# Patient Record
Sex: MALE | Race: WHITE | Employment: FULL TIME | URBAN - METROPOLITAN AREA
[De-identification: names, ages, dates, MRNs, and addresses within clinical notes are randomized per-mention and may not be internally consistent; named-entity substitution may affect disease eponyms.]

---

## 2017-02-16 DIAGNOSIS — I25.10 CAD (CORONARY ARTERY DISEASE): ICD-10-CM

## 2017-02-16 DIAGNOSIS — E78.00 HYPERCHOLESTEROLEMIA: ICD-10-CM

## 2017-02-16 RX ORDER — ATORVASTATIN CALCIUM 40 MG/1
40 TABLET, FILM COATED ORAL DAILY
Qty: 90 TABLET | Refills: 3 | Status: SHIPPED | OUTPATIENT
Start: 2017-02-16 | End: 2017-02-21

## 2017-02-21 DIAGNOSIS — I25.10 CAD (CORONARY ARTERY DISEASE): ICD-10-CM

## 2017-02-21 DIAGNOSIS — E78.00 HYPERCHOLESTEROLEMIA: ICD-10-CM

## 2017-02-21 RX ORDER — ATORVASTATIN CALCIUM 40 MG/1
40 TABLET, FILM COATED ORAL DAILY
Qty: 90 TABLET | Refills: 2 | Status: SHIPPED | OUTPATIENT
Start: 2017-02-21 | End: 2018-03-30

## 2017-03-15 ENCOUNTER — OFFICE VISIT (OUTPATIENT)
Dept: FAMILY MEDICINE | Facility: CLINIC | Age: 57
End: 2017-03-15
Payer: COMMERCIAL

## 2017-03-15 VITALS
HEART RATE: 81 BPM | HEIGHT: 73 IN | BODY MASS INDEX: 28.34 KG/M2 | SYSTOLIC BLOOD PRESSURE: 128 MMHG | DIASTOLIC BLOOD PRESSURE: 78 MMHG | WEIGHT: 213.8 LBS | OXYGEN SATURATION: 98 % | TEMPERATURE: 98.1 F

## 2017-03-15 DIAGNOSIS — Z11.59 NEED FOR HEPATITIS C SCREENING TEST: ICD-10-CM

## 2017-03-15 DIAGNOSIS — Z00.00 ENCOUNTER FOR ROUTINE ADULT HEALTH EXAMINATION WITHOUT ABNORMAL FINDINGS: Primary | ICD-10-CM

## 2017-03-15 DIAGNOSIS — R06.83 SNORING: ICD-10-CM

## 2017-03-15 DIAGNOSIS — H61.23 BILATERAL IMPACTED CERUMEN: ICD-10-CM

## 2017-03-15 LAB
ALBUMIN SERPL-MCNC: 4 G/DL (ref 3.4–5)
ALP SERPL-CCNC: 78 U/L (ref 40–150)
ALT SERPL W P-5'-P-CCNC: 38 U/L (ref 0–70)
ANION GAP SERPL CALCULATED.3IONS-SCNC: 5 MMOL/L (ref 3–14)
AST SERPL W P-5'-P-CCNC: 19 U/L (ref 0–45)
BASOPHILS # BLD AUTO: 0 10E9/L (ref 0–0.2)
BASOPHILS NFR BLD AUTO: 0.5 %
BILIRUB SERPL-MCNC: 1.4 MG/DL (ref 0.2–1.3)
BUN SERPL-MCNC: 15 MG/DL (ref 7–30)
CALCIUM SERPL-MCNC: 9.2 MG/DL (ref 8.5–10.1)
CHLORIDE SERPL-SCNC: 105 MMOL/L (ref 94–109)
CHOLEST SERPL-MCNC: 163 MG/DL
CO2 SERPL-SCNC: 32 MMOL/L (ref 20–32)
CREAT SERPL-MCNC: 0.97 MG/DL (ref 0.66–1.25)
DIFFERENTIAL METHOD BLD: NORMAL
EOSINOPHIL # BLD AUTO: 0.4 10E9/L (ref 0–0.7)
EOSINOPHIL NFR BLD AUTO: 5.1 %
ERYTHROCYTE [DISTWIDTH] IN BLOOD BY AUTOMATED COUNT: 12.7 % (ref 10–15)
GFR SERPL CREATININE-BSD FRML MDRD: 80 ML/MIN/1.7M2
GLUCOSE SERPL-MCNC: 104 MG/DL (ref 70–99)
HCT VFR BLD AUTO: 46.3 % (ref 40–53)
HCV AB SERPL QL IA: NORMAL
HDLC SERPL-MCNC: 50 MG/DL
HGB BLD-MCNC: 16.1 G/DL (ref 13.3–17.7)
LDLC SERPL CALC-MCNC: 98 MG/DL
LYMPHOCYTES # BLD AUTO: 2.5 10E9/L (ref 0.8–5.3)
LYMPHOCYTES NFR BLD AUTO: 32.1 %
MCH RBC QN AUTO: 31.5 PG (ref 26.5–33)
MCHC RBC AUTO-ENTMCNC: 34.8 G/DL (ref 31.5–36.5)
MCV RBC AUTO: 91 FL (ref 78–100)
MONOCYTES # BLD AUTO: 0.6 10E9/L (ref 0–1.3)
MONOCYTES NFR BLD AUTO: 8.2 %
NEUTROPHILS # BLD AUTO: 4.1 10E9/L (ref 1.6–8.3)
NEUTROPHILS NFR BLD AUTO: 54.1 %
NONHDLC SERPL-MCNC: 113 MG/DL
PLATELET # BLD AUTO: 245 10E9/L (ref 150–450)
POTASSIUM SERPL-SCNC: 4.7 MMOL/L (ref 3.4–5.3)
PROT SERPL-MCNC: 7.8 G/DL (ref 6.8–8.8)
PSA SERPL-ACNC: 0.82 UG/L (ref 0–4)
RBC # BLD AUTO: 5.11 10E12/L (ref 4.4–5.9)
SODIUM SERPL-SCNC: 142 MMOL/L (ref 133–144)
TRIGL SERPL-MCNC: 74 MG/DL
TSH SERPL DL<=0.005 MIU/L-ACNC: 1.99 MU/L (ref 0.4–4)
WBC # BLD AUTO: 7.6 10E9/L (ref 4–11)

## 2017-03-15 PROCEDURE — 36415 COLL VENOUS BLD VENIPUNCTURE: CPT | Performed by: PHYSICIAN ASSISTANT

## 2017-03-15 PROCEDURE — G0103 PSA SCREENING: HCPCS | Performed by: PHYSICIAN ASSISTANT

## 2017-03-15 PROCEDURE — 86803 HEPATITIS C AB TEST: CPT | Performed by: PHYSICIAN ASSISTANT

## 2017-03-15 PROCEDURE — 80061 LIPID PANEL: CPT | Performed by: PHYSICIAN ASSISTANT

## 2017-03-15 PROCEDURE — 80050 GENERAL HEALTH PANEL: CPT | Performed by: PHYSICIAN ASSISTANT

## 2017-03-15 PROCEDURE — 99396 PREV VISIT EST AGE 40-64: CPT | Performed by: PHYSICIAN ASSISTANT

## 2017-03-15 NOTE — PROGRESS NOTES
SUBJECTIVE:     CC: Beny Jerome is an 57 year old male who presents for preventative health visit.     Healthy Habits:    Do you get at least three servings of calcium containing foods daily (dairy, green leafy vegetables, etc.)? yes    Amount of exercise or daily activities, outside of work: 7 day(s) per week, the 2 weeks on and off, usually does 10,000 steps a day    Problems taking medications regularly No    Medication side effects: No    Have you had an eye exam in the past two years? no    Do you see a dentist twice per year? yes    Do you have sleep apnea, excessive snoring or daytime drowsiness? Wife states that she notice he stops breathing in the night and will wake him up if she notices        Patient reports that his wife is concerned because she hears him stop breathing at night.  He reports that he is tired during the day and has been told that he snores at night.  He has a history of heart disease and has a strong family history of heart disease as well.        Today's PHQ-2 Score:   PHQ-2 ( 1999 [employer]) 3/15/2017 2/24/2012   Q1: Little interest or pleasure in doing things 0 0   Q2: Feeling down, depressed or hopeless 0 0   PHQ-2 Score 0 0       Abuse: Current or Past(Physical, Sexual or Emotional)- No  Do you feel safe in your environment - Yes    Social History   Substance Use Topics     Smoking status: Never Smoker     Smokeless tobacco: Never Used     Alcohol use No      Comment: quit 1990     The patient does not drink >3 drinks per day nor >7 drinks per week.    Last PSA:   PSA   Date Value Ref Range Status   01/28/2015 1.00 0 - 4 ug/L Final       Recent Labs   Lab Test  06/23/15   0812  03/16/12   0851   CHOL  143  256*   HDL  47  49   LDL  74  182*   TRIG  108  126   CHOLHDLRATIO  3.0  5.2*       Reviewed orders with patient. Reviewed health maintenance and updated orders accordingly - Yes    Reviewed and updated as needed this visit by clinical staff  Tobacco  Allergies  Meds  Med Hx   "Surg Hx  Fam Hx  Soc Hx        Reviewed and updated as needed this visit by Provider            ROS:  C: NEGATIVE for fever, chills, change in weight  I: NEGATIVE for worrisome rashes, moles or lesions  E: NEGATIVE for vision changes or irritation  ENT: NEGATIVE for ear, mouth and throat problems  R: NEGATIVE for significant cough or SOB  CV: NEGATIVE for chest pain, palpitations or peripheral edema  GI: NEGATIVE for nausea, abdominal pain, heartburn, or change in bowel habits   male: negative for dysuria, hematuria, decreased urinary stream, erectile dysfunction, urethral discharge  M: NEGATIVE for significant arthralgias or myalgia  N: NEGATIVE for weakness, dizziness or paresthesias  P: NEGATIVE for changes in mood or affect    Problem list, Medication list, Allergies, and Medical/Social/Surgical histories reviewed in EPIC and updated as appropriate.  OBJECTIVE:     /78 (BP Location: Left arm, Patient Position: Chair, Cuff Size: Adult Large)  Pulse 81  Temp 98.1  F (36.7  C) (Oral)  Ht 6' 1\" (1.854 m)  Wt 213 lb 12.8 oz (97 kg)  SpO2 98%  BMI 28.21 kg/m2  EXAM:  GENERAL: healthy, alert and no distress  EYES: Eyes grossly normal to inspection, PERRL and conjunctivae and sclerae normal  HENT: ear canals and TM's normal, nose and mouth without ulcers or lesions  NECK: no adenopathy, no asymmetry, masses, or scars and thyroid normal to palpation  RESP: lungs clear to auscultation - no rales, rhonchi or wheezes  CV: regular rate and rhythm, normal S1 S2, no S3 or S4, no murmur, click or rub, no peripheral edema and peripheral pulses strong  ABDOMEN: soft, nontender, no hepatosplenomegaly, no masses and bowel sounds normal   (male): normal male genitalia without lesions or urethral discharge, no hernia  RECTAL: normal sphincter tone, no rectal masses, prostate normal size, smooth, nontender without nodules or masses  MS: no gross musculoskeletal defects noted, no edema  SKIN: no suspicious lesions or " "rashes  NEURO: Normal strength and tone, mentation intact and speech normal  PSYCH: mentation appears normal, affect normal/bright    ASSESSMENT/PLAN:     1. Encounter for routine adult health examination without abnormal findings    - PROSTATE SPEC ANTIGEN SCREEN  - Lipid Profile with reflex to direct LDL  - CBC with platelets and differential  - Comprehensive metabolic panel (BMP + Alb, Alk Phos, ALT, AST, Total. Bili, TP)  - TSH with free T4 reflex    2. Need for hepatitis C screening test    - Hepatitis C Screen Reflex to HCV RNA Quant and Genotype    3. Snoring    - SLEEP EVALUATION & MANAGEMENT REFERRAL - ADULT; Future    4. Bilateral impacted cerumen    - REMOVE IMPACTED CERUMEN    COUNSELING:  Reviewed preventive health counseling, as reflected in patient instructions         reports that he has never smoked. He has never used smokeless tobacco.    Estimated body mass index is 28.21 kg/(m^2) as calculated from the following:    Height as of this encounter: 6' 1\" (1.854 m).    Weight as of this encounter: 213 lb 12.8 oz (97 kg).   Weight management plan: Discussed healthy diet and exercise guidelines and patient will follow up in 12 months in clinic to re-evaluate.    Counseling Resources:  ATP IV Guidelines  Pooled Cohorts Equation Calculator  FRAX Risk Assessment  ICSI Preventive Guidelines  Dietary Guidelines for Americans, 2010  USDA's MyPlate  ASA Prophylaxis  Lung CA Screening    Mavis Arriaga PA-C  [facility]  "

## 2017-03-15 NOTE — MR AVS SNAPSHOT
After Visit Summary   3/15/2017    Beny Jerome    MRN: [id number]           Patient Information     Date Of Birth          1960        Visit Information        Provider Department      3/15/2017 7:40 AM Mavis Arriaga PA-C [facility] [place] [facility]        Today's Diagnoses     Encounter for routine adult health examination without abnormal findings    -  1    Need for hepatitis C screening test        Snoring          Care Instructions    Please make a sleep appointment.       Preventive Health Recommendations  Male Ages 50 - 64    Yearly exam:             See your health care provider every year in order to  o   Review health changes.   o   Discuss preventive care.    o   Review your medicines if your doctor has prescribed any.     Have a cholesterol test every 5 years, or more frequently if you are at risk for high cholesterol/heart disease.     Have a diabetes test (fasting glucose) every three years. If you are at risk for diabetes, you should have this test more often.     Have a colonoscopy at age 50, or have a yearly FIT test (stool test). These exams will check for colon cancer.      Talk with your health care provider about whether or not a prostate cancer screening test (PSA) is right for you.    You should be tested each year for STDs (sexually transmitted diseases), if you re at risk.     Shots: Get a flu shot each year. Get a tetanus shot every 10 years.     Nutrition:    Eat at least 5 servings of fruits and vegetables daily.     Eat whole-grain bread, whole-wheat pasta and brown rice instead of white grains and rice.     Talk to your provider about Calcium and Vitamin D.     Lifestyle    Exercise for at least 150 minutes a week (30 minutes a day, 5 days a week). This will help you control your weight and prevent disease.     Limit alcohol to one drink per day.     No smoking.     Wear sunscreen to prevent skin cancer.     See your dentist every six months for an exam and  cleaning.     See your eye doctor every 1 to 2 years.          Follow-ups after your visit        Additional Services     SLEEP EVALUATION & MANAGEMENT REFERRAL - ADULT       Please be aware that coverage of these services is subject to the terms and limitations of your health insurance plan.  Call member services at your health plan with any benefit or coverage questions.      Please bring the following to your appointment:    >>   List of current medications   >>   This referral request   >>   Any documents/labs given to you for this referral    [facility] [phone] (Age 18 and up)                  Future tests that were ordered for you today     Open Future Orders        Priority Expected Expires Ordered    SLEEP EVALUATION & MANAGEMENT REFERRAL - ADULT Routine  3/15/2018 3/15/2017            Who to contact     If you have questions or need follow up information about today's clinic visit or your schedule please contact [facility] directly at [phone].  Normal or non-critical lab and imaging results will be communicated to you by MyChart, letter or phone within 4 business days after the clinic has received the results. If you do not hear from us within 7 days, please contact the clinic through [employer] or phone. If you have a critical or abnormal lab result, we will notify you by phone as soon as possible.  Submit refill requests through [employer] or call your pharmacy and they will forward the refill request to us. Please allow 3 business days for your refill to be completed.          Additional Information About Your Visit        [employer] Information     [employer] gives you secure access to your electronic health record. If you see a primary care provider, you can also send messages to your care team and make appointments. If you have questions, please call your primary care clinic.  If you do not have a primary care provider, please call [phone] and they will  "assist you.        Care EveryWhere ID     This is your Care EveryWhere ID. This could be used by other organizations to access your [place] medical records  [id number]        Your Vitals Were     Pulse Temperature Height Pulse Oximetry BMI (Body Mass Index)       81 98.1  F (36.7  C) (Oral) 6' 1\" (1.854 m) 98% 28.21 kg/m2        Blood Pressure from Last 3 Encounters:   03/15/17 128/78   09/10/15 136/72   06/23/15 122/68    Weight from Last 3 Encounters:   03/15/17 213 lb 12.8 oz (97 kg)   09/10/15 208 lb (94.3 kg)   06/23/15 201 lb (91.2 kg)              We Performed the Following     CBC with platelets and differential     Comprehensive metabolic panel (BMP + Alb, Alk Phos, ALT, AST, Total. Bili, TP)     Hepatitis C Screen Reflex to HCV RNA Quant and Genotype     Lipid Profile with reflex to direct LDL     PROSTATE SPEC ANTIGEN SCREEN     TSH with free T4 reflex          Today's Medication Changes          These changes are accurate as of: 3/15/17  8:51 AM.  If you have any questions, ask your nurse or doctor.               Stop taking these medicines if you haven't already. Please contact your care team if you have questions.     melatonin 5 MG Caps   Stopped by:  Mavis Arriaga PA-C                    Primary Care Provider Office Phone # Fax #    EN Rubio [phone]       [address] [address]        Thank you!     Thank you for choosing [facility]  for your care. Our goal is always to provide you with excellent care. Hearing back from our patients is one way we can continue to improve our services. Please take a few minutes to complete the written survey that you may receive in the mail after your visit with us. Thank you!             Your Updated Medication List - Protect others around you: Learn how to safely use, store and throw away your medicines at www.disposemymeds.org.          This list is accurate as " of: 3/15/17  8:51 AM.  Always use your most recent med list.                   Brand Name Dispense Instructions for use    ASPIRIN ADULT LOW STRENGTH PO      Take 81 mg by mouth daily       atorvastatin 40 MG tablet    LIPITOR    90 tablet    Take 1 tablet (40 mg) by mouth daily       EXCEDRIN MIGRAINE 250-250-65 MG per tablet   Generic drug:  aspirin-acetaminophen-caffeine     80 tablet    Take 1 tablet by mouth every 6 hours as needed for headaches       ibuprofen 200 MG tablet    ADVIL/MOTRIN     Take 400 mg by mouth every 4 hours as needed for mild pain       PRILOSEC PO      Take 20 mg by mouth daily Unsure of dosage       SUMAtriptan 50 MG tablet    IMITREX    4 tablet    Take 1 tablet by mouth at onset of headache for migraine. May repeat in 2 hours if needed: max 2/day; avg number of headaches monthly: less than 1

## 2017-03-15 NOTE — NURSING NOTE
"Chief Complaint   Patient presents with     Physical       Initial /78 (BP Location: Left arm, Patient Position: Chair, Cuff Size: Adult Large)  Pulse 81  Temp 98.1  F (36.7  C) (Oral)  Ht 6' 1\" (1.854 m)  Wt 213 lb 12.8 oz (97 kg)  SpO2 98%  BMI 28.21 kg/m2 Estimated body mass index is 28.21 kg/(m^2) as calculated from the following:    Height as of this encounter: 6' 1\" (1.854 m).    Weight as of this encounter: 213 lb 12.8 oz (97 kg).  Medication Reconciliation: complete   Natalie Herbert CMA  "

## 2017-03-15 NOTE — NURSING NOTE
"Chief Complaint   Patient presents with     Physical       Initial /78 (BP Location: Left arm, Patient Position: Chair, Cuff Size: Adult Large)  Pulse 81  Temp 98.1  F (36.7  C) (Oral)  Ht 6' 1\" (1.854 m)  Wt 213 lb 12.8 oz (97 kg)  SpO2 98%  BMI 28.21 kg/m2 Estimated body mass index is 28.21 kg/(m^2) as calculated from the following:    Height as of this encounter: 6' 1\" (1.854 m).    Weight as of this encounter: 213 lb 12.8 oz (97 kg).  Medication Reconciliation: complete     Cerumenosis is noted.  Wax is removed by syringing and manual debridement. Instructions for home care to prevent wax buildup are given. -- Bilateral    "

## 2017-03-15 NOTE — PATIENT INSTRUCTIONS
Please make a sleep appointment.       Preventive Health Recommendations  Male Ages 50 - 64    Yearly exam:             See your health care provider every year in order to  o   Review health changes.   o   Discuss preventive care.    o   Review your medicines if your doctor has prescribed any.     Have a cholesterol test every 5 years, or more frequently if you are at risk for high cholesterol/heart disease.     Have a diabetes test (fasting glucose) every three years. If you are at risk for diabetes, you should have this test more often.     Have a colonoscopy at age 50, or have a yearly FIT test (stool test). These exams will check for colon cancer.      Talk with your health care provider about whether or not a prostate cancer screening test (PSA) is right for you.    You should be tested each year for STDs (sexually transmitted diseases), if you re at risk.     Shots: Get a flu shot each year. Get a tetanus shot every 10 years.     Nutrition:    Eat at least 5 servings of fruits and vegetables daily.     Eat whole-grain bread, whole-wheat pasta and brown rice instead of white grains and rice.     Talk to your provider about Calcium and Vitamin D.     Lifestyle    Exercise for at least 150 minutes a week (30 minutes a day, 5 days a week). This will help you control your weight and prevent disease.     Limit alcohol to one drink per day.     No smoking.     Wear sunscreen to prevent skin cancer.     See your dentist every six months for an exam and cleaning.     See your eye doctor every 1 to 2 years.

## 2017-03-20 NOTE — PROGRESS NOTES
Sandra Swan ,    The results from your recent lab work are within normal limits.    -Liver and gallbladder tests (ALT,AST, Alk phos,bilirubin) are normal.  -Kidney function (GFR) is normal.  -Sodium is normal.  -Potassium is normal.  -Glucose is slight elevated and may be sign of early diabetes (prediabetes). ADVISE:: low carbohydrate diet, exercise, try to lose weight (if necessary) and recheck glucose in 6 months. (GLU,A1C, DX: prediabetes)  -Cholesterol levels (LDL,HDL, Triglycerides) are normal.  ADVISE: rechecking in 1 year.  -PSA (prostate specific antigen) test is normal.  This indicates a low likelihood of prostate cancer.  ADVISE: yearly recheck.   -TSH (thyroid stimulating hormone) level is normal which indicates normal thyroid function.  -Normal red blood cell (hgb) levels, normal white blood cell count and normal platelet levels.  -Hepatitis C antibody screen test shows no signs of a previous hepatitis C infection.      Thank you for choosing [facility] for your health care needs,      Mavis Arriaga PA-C

## 2017-05-02 ENCOUNTER — TELEPHONE (OUTPATIENT)
Dept: FAMILY MEDICINE | Facility: CLINIC | Age: 57
End: 2017-05-02

## 2017-05-02 DIAGNOSIS — R73.09 ELEVATED GLUCOSE: Primary | ICD-10-CM

## 2017-05-02 NOTE — TELEPHONE ENCOUNTER
Pt called for results     -Liver and gallbladder tests (ALT,AST, Alk phos,bilirubin) are normal.   -Kidney function (GFR) is normal.   -Sodium is normal.   -Potassium is normal.   -Glucose is slight elevated and may be sign of early diabetes (prediabetes). ADVISE:: low carbohydrate diet, exercise, try to lose weight (if necessary) and recheck glucose in 6 months. (GLU,A1C, DX: prediabetes)   -Cholesterol levels (LDL,HDL, Triglycerides) are normal.  ADVISE: rechecking in 1 year.   -PSA (prostate specific antigen) test is normal.  This indicates a low likelihood of prostate cancer.  ADVISE: yearly recheck.   -TSH (thyroid stimulating hormone) level is normal which indicates normal thyroid function.   -Normal red blood cell (hgb) levels, normal white blood cell count and normal platelet levels.   -Hepatitis C antibody screen test shows no signs of a previous hepatitis C infection.       Thank you for choosing [facility] for your health care needs,       MELVINA Aburto RN    [facility]

## 2018-01-05 ENCOUNTER — TRANSFERRED RECORDS (OUTPATIENT)
Dept: HEALTH INFORMATION MANAGEMENT | Facility: CLINIC | Age: 58
End: 2018-01-05

## 2018-03-30 ENCOUNTER — OFFICE VISIT (OUTPATIENT)
Dept: FAMILY MEDICINE | Facility: CLINIC | Age: 58
End: 2018-03-30
Payer: COMMERCIAL

## 2018-03-30 VITALS
HEIGHT: 73 IN | BODY MASS INDEX: 28.76 KG/M2 | OXYGEN SATURATION: 98 % | TEMPERATURE: 98.5 F | WEIGHT: 217 LBS | HEART RATE: 84 BPM | SYSTOLIC BLOOD PRESSURE: 132 MMHG | DIASTOLIC BLOOD PRESSURE: 80 MMHG

## 2018-03-30 DIAGNOSIS — E78.5 HYPERLIPIDEMIA LDL GOAL <100: ICD-10-CM

## 2018-03-30 DIAGNOSIS — Z00.00 ROUTINE GENERAL MEDICAL EXAMINATION AT A HEALTH CARE FACILITY: Primary | ICD-10-CM

## 2018-03-30 DIAGNOSIS — Z12.5 SCREENING FOR PROSTATE CANCER: ICD-10-CM

## 2018-03-30 DIAGNOSIS — M62.830 BACK MUSCLE SPASM: ICD-10-CM

## 2018-03-30 DIAGNOSIS — R06.83 SNORING: ICD-10-CM

## 2018-03-30 DIAGNOSIS — E78.00 HYPERCHOLESTEROLEMIA: ICD-10-CM

## 2018-03-30 DIAGNOSIS — I25.10 CORONARY ARTERY DISEASE INVOLVING NATIVE HEART WITHOUT ANGINA PECTORIS, UNSPECIFIED VESSEL OR LESION TYPE: ICD-10-CM

## 2018-03-30 LAB
ALBUMIN SERPL-MCNC: 4.3 G/DL (ref 3.4–5)
ALP SERPL-CCNC: 65 U/L (ref 40–150)
ALT SERPL W P-5'-P-CCNC: 34 U/L (ref 0–70)
ANION GAP SERPL CALCULATED.3IONS-SCNC: 8 MMOL/L (ref 3–14)
AST SERPL W P-5'-P-CCNC: 22 U/L (ref 0–45)
BASOPHILS # BLD AUTO: 0 10E9/L (ref 0–0.2)
BASOPHILS NFR BLD AUTO: 0.4 %
BILIRUB SERPL-MCNC: 1.4 MG/DL (ref 0.2–1.3)
BUN SERPL-MCNC: 21 MG/DL (ref 7–30)
CALCIUM SERPL-MCNC: 9.3 MG/DL (ref 8.5–10.1)
CHLORIDE SERPL-SCNC: 104 MMOL/L (ref 94–109)
CHOLEST SERPL-MCNC: 178 MG/DL
CO2 SERPL-SCNC: 27 MMOL/L (ref 20–32)
CREAT SERPL-MCNC: 1 MG/DL (ref 0.66–1.25)
DIFFERENTIAL METHOD BLD: NORMAL
EOSINOPHIL # BLD AUTO: 0.4 10E9/L (ref 0–0.7)
EOSINOPHIL NFR BLD AUTO: 4.4 %
ERYTHROCYTE [DISTWIDTH] IN BLOOD BY AUTOMATED COUNT: 12.6 % (ref 10–15)
GFR SERPL CREATININE-BSD FRML MDRD: 77 ML/MIN/1.7M2
GLUCOSE SERPL-MCNC: 100 MG/DL (ref 70–99)
HCT VFR BLD AUTO: 44.5 % (ref 40–53)
HDLC SERPL-MCNC: 51 MG/DL
HGB BLD-MCNC: 15.5 G/DL (ref 13.3–17.7)
LDLC SERPL CALC-MCNC: 107 MG/DL
LYMPHOCYTES # BLD AUTO: 2.7 10E9/L (ref 0.8–5.3)
LYMPHOCYTES NFR BLD AUTO: 32 %
MCH RBC QN AUTO: 31.6 PG (ref 26.5–33)
MCHC RBC AUTO-ENTMCNC: 34.8 G/DL (ref 31.5–36.5)
MCV RBC AUTO: 91 FL (ref 78–100)
MONOCYTES # BLD AUTO: 0.5 10E9/L (ref 0–1.3)
MONOCYTES NFR BLD AUTO: 5.6 %
NEUTROPHILS # BLD AUTO: 4.9 10E9/L (ref 1.6–8.3)
NEUTROPHILS NFR BLD AUTO: 57.6 %
NONHDLC SERPL-MCNC: 127 MG/DL
PLATELET # BLD AUTO: 260 10E9/L (ref 150–450)
POTASSIUM SERPL-SCNC: 4.3 MMOL/L (ref 3.4–5.3)
PROT SERPL-MCNC: 7.7 G/DL (ref 6.8–8.8)
PSA SERPL-ACNC: 1.14 UG/L (ref 0–4)
RBC # BLD AUTO: 4.91 10E12/L (ref 4.4–5.9)
SODIUM SERPL-SCNC: 139 MMOL/L (ref 133–144)
TRIGL SERPL-MCNC: 98 MG/DL
TSH SERPL DL<=0.005 MIU/L-ACNC: 2.02 MU/L (ref 0.4–4)
WBC # BLD AUTO: 8.5 10E9/L (ref 4–11)

## 2018-03-30 PROCEDURE — G0103 PSA SCREENING: HCPCS | Performed by: PHYSICIAN ASSISTANT

## 2018-03-30 PROCEDURE — 84443 ASSAY THYROID STIM HORMONE: CPT | Performed by: PHYSICIAN ASSISTANT

## 2018-03-30 PROCEDURE — 85025 COMPLETE CBC W/AUTO DIFF WBC: CPT | Performed by: PHYSICIAN ASSISTANT

## 2018-03-30 PROCEDURE — 80061 LIPID PANEL: CPT | Performed by: PHYSICIAN ASSISTANT

## 2018-03-30 PROCEDURE — 99396 PREV VISIT EST AGE 40-64: CPT | Performed by: PHYSICIAN ASSISTANT

## 2018-03-30 PROCEDURE — 36415 COLL VENOUS BLD VENIPUNCTURE: CPT | Performed by: PHYSICIAN ASSISTANT

## 2018-03-30 PROCEDURE — 99213 OFFICE O/P EST LOW 20 MIN: CPT | Mod: 25 | Performed by: PHYSICIAN ASSISTANT

## 2018-03-30 PROCEDURE — 80053 COMPREHEN METABOLIC PANEL: CPT | Performed by: PHYSICIAN ASSISTANT

## 2018-03-30 RX ORDER — CYCLOBENZAPRINE HCL 5 MG
5 TABLET ORAL 3 TIMES DAILY PRN
Qty: 30 TABLET | Refills: 0 | Status: SHIPPED | OUTPATIENT
Start: 2018-03-30 | End: 2019-07-22

## 2018-03-30 RX ORDER — ATORVASTATIN CALCIUM 40 MG/1
40 TABLET, FILM COATED ORAL DAILY
Qty: 90 TABLET | Refills: 3 | Status: SHIPPED | OUTPATIENT
Start: 2018-03-30 | End: 2018-05-18

## 2018-03-30 NOTE — NURSING NOTE
"Chief Complaint   Patient presents with     Physical       Initial /80 (BP Location: Left arm, Patient Position: Chair, Cuff Size: Adult Large)  Pulse 84  Temp 98.5  F (36.9  C) (Oral)  Ht 6' 1\" (1.854 m)  Wt 217 lb (98.4 kg)  SpO2 98%  BMI 28.63 kg/m2 Estimated body mass index is 28.63 kg/(m^2) as calculated from the following:    Height as of this encounter: 6' 1\" (1.854 m).    Weight as of this encounter: 217 lb (98.4 kg).  Medication Reconciliation: complete   Csaba Mlnarik CMA    "

## 2018-03-30 NOTE — PROGRESS NOTES
SUBJECTIVE:   CC: Beny Jerome is an 58 year old male who presents for preventative health visit.     Healthy Habits:    Do you get at least three servings of calcium containing foods daily (dairy, green leafy vegetables, etc.)? No,2 servings taking calcium and/or vitamin D supplement: no    Amount of exercise or daily activities, outside of work: 10,000 steps per day - not last few months    Problems taking medications regularly No    Medication side effects: No    Have you had an eye exam in the past two years? no    Do you see a dentist twice per year? yes  Do you have sleep apnea, excessive snoring or daytime drowsiness? Snores - does not sleep well     Hyperlipidemia Follow-Up      Rate your low fat/cholesterol diet?: fair    Taking statin?  Yes, no muscle aches from statin    Other lipid medications/supplements?:  none    Migraine Follow-Up    Headaches symptoms:  Stable     Frequency: this morning - prior was 2-3 weeks     Duration of headaches: usually goes away after couple hours - can last 2-3 days    Able to do normal daily activities/work with migraines: Yes    Rescue/Relief medication:Excedrin              Effectiveness: moderate to total relief    Preventative medication: None    Neurologic complications: No new stroke-like symptoms, loss of vision or speech, numbness or weakness    In the past 4 weeks, how often have you gone to Urgent Care or the emergency room because of your headaches?  0    Patient reports that he is snoring at night and his wife hears him actually stop breathing.  He was advised to see a sleep specialist last year, but never did.  Some days he has daytime fatigue, but he reports to sleeping well sometimes.         He has had a history of back spasms.  He gets flare ups about 2-3 times a year and tends to come on the day after he physically works too hard.  He was last prescribed Valium for the spasms in 2007.  He did do physical therapy in [id number].    Spasm started this  "last sat and is improving.  He denies any numbness or tingling down his legs, and he has not had any loss of bladder or bowel control.      Today's PHQ-2 Score: 0-0  PHQ-2 ( 1999 [employer]) 3/15/2017 2/24/2012   Q1: Little interest or pleasure in doing things 0 0   Q2: Feeling down, depressed or hopeless 0 0   PHQ-2 Score 0 0       Abuse: Current or Past(Physical, Sexual or Emotional)- No  Do you feel safe in your environment - Yes    Social History   Substance Use Topics     Smoking status: Never Smoker     Smokeless tobacco: Never Used     Alcohol use No      Comment: quit 1990      If you drink alcohol do you typically have >3 drinks per day or >7 drinks per week? Not Applicable                      Last PSA:   PSA   Date Value Ref Range Status   03/15/2017 0.82 0 - 4 ug/L Final     Comment:     Assay Method:  Chemiluminescence using Siemens Vista analyzer       Reviewed orders with patient. Reviewed health maintenance and updated orders accordingly - Yes  Labs reviewed in EPIC    Reviewed and updated as needed this visit by clinical staff         Reviewed and updated as needed this visit by Provider            ROS:  C: NEGATIVE for fever, chills, change in weight  I: NEGATIVE for worrisome rashes, moles or lesions  E: NEGATIVE for vision changes or irritation  ENT: NEGATIVE for ear, mouth and throat problems  R: NEGATIVE for significant cough or SOB  CV: NEGATIVE for chest pain, palpitations or peripheral edema  GI: NEGATIVE for nausea, abdominal pain, heartburn, or change in bowel habits   male: negative for dysuria, hematuria, decreased urinary stream, erectile dysfunction, urethral discharge  M: NEGATIVE for significant arthralgias or myalgia  N: NEGATIVE for weakness, dizziness or paresthesias  P: NEGATIVE for changes in mood or affect    OBJECTIVE:   /80 (BP Location: Left arm, Patient Position: Chair, Cuff Size: Adult Large)  Pulse 84  Temp 98.5  F (36.9  C) (Oral)  Ht 6' 1\" (1.854 m)  Wt 217 lb " (98.4 kg)  SpO2 98%  BMI 28.63 kg/m2  EXAM:  GENERAL: healthy, alert and no distress  EYES: Eyes grossly normal to inspection, PERRL and conjunctivae and sclerae normal  HENT: ear canals and TM's normal, nose and mouth without ulcers or lesions  NECK: no adenopathy, no asymmetry, masses, or scars and thyroid normal to palpation  RESP: lungs clear to auscultation - no rales, rhonchi or wheezes  CV: regular rate and rhythm, normal S1 S2, no S3 or S4, no murmur, click or rub, no peripheral edema and peripheral pulses strong  ABDOMEN: soft, nontender, no hepatosplenomegaly, no masses and bowel sounds normal   (male): normal male genitalia without lesions or urethral discharge, no hernia  RECTAL: normal sphincter tone, no rectal masses, prostate normal size, smooth, nontender without nodules or masses  MS: no gross musculoskeletal defects noted, no edema  SKIN: no suspicious lesions or rashes  NEURO: Normal strength and tone, mentation intact and speech normal  PSYCH: mentation appears normal, affect normal/bright    ASSESSMENT/PLAN:   1. Routine general medical examination at a health care facility    - PROSTATE SPEC ANTIGEN SCREEN  - Lipid panel reflex to direct LDL Fasting  - CBC with platelets and differential  - Comprehensive metabolic panel (BMP + Alb, Alk Phos, ALT, AST, Total. Bili, TP)  - TSH with free T4 reflex    2. Back muscle spasm    - cyclobenzaprine (FLEXERIL) 5 MG tablet; Take 1 tablet (5 mg) by mouth 3 times daily as needed for muscle spasms  Dispense: 30 tablet; Refill: 0  - PHYSICAL THERAPY REFERRAL    - Patient has been advised to try the flexeril to the muscle spasm.  He has been advised to NOT take this medication with the Valium.  He has been advised to not take the valium for the back pain, and patient understands.    - Discussed the role of physical therapy, and patient was encouraged to look into this as well.    - Patient has been advised to followup if symptoms are not improving.  He  "should seek more immediate medical attention if symptoms change or worsen in any way.     3. Coronary artery disease involving native heart without angina pectoris, unspecified vessel or lesion type    - atorvastatin (LIPITOR) 40 MG tablet; Take 1 tablet (40 mg) by mouth daily  Dispense: 90 tablet; Refill: 3  - Lipid panel reflex to direct LDL Fasting    4. Hyperlipidemia LDL goal <100    - Lipid panel reflex to direct LDL Fasting    5. Snoring    - SLEEP EVALUATION & MANAGEMENT REFERRAL - ADULT -[facility] [place]  [phone] (Age 18 and up); Future    6. Hypercholesterolemia    - atorvastatin (LIPITOR) 40 MG tablet; Take 1 tablet (40 mg) by mouth daily  Dispense: 90 tablet; Refill: 3    7. Screening for prostate cancer    - PROSTATE SPEC ANTIGEN SCREEN    COUNSELING:  Reviewed preventive health counseling, as reflected in patient instructions       reports that he has never smoked. He has never used smokeless tobacco.    Estimated body mass index is 28.21 kg/(m^2) as calculated from the following:    Height as of 3/15/17: 6' 1\" (1.854 m).    Weight as of 3/15/17: 213 lb 12.8 oz (97 kg).   Weight management plan: Discussed healthy diet and exercise guidelines and patient will follow up in 12 months in clinic to re-evaluate.    Counseling Resources:  ATP IV Guidelines  Pooled Cohorts Equation Calculator  FRAX Risk Assessment  ICSI Preventive Guidelines  Dietary Guidelines for Americans, 2010  USDA's MyPlate  ASA Prophylaxis  Lung CA Screening    Mavis Arriaga PA-C  [facility]  "

## 2018-03-30 NOTE — PATIENT INSTRUCTIONS
Preventive Health Recommendations  Male Ages 50 - 64    Yearly exam:             See your health care provider every year in order to  o   Review health changes.   o   Discuss preventive care.    o   Review your medicines if your doctor has prescribed any.     Have a cholesterol test every 5 years, or more frequently if you are at risk for high cholesterol/heart disease.     Have a diabetes test (fasting glucose) every three years. If you are at risk for diabetes, you should have this test more often.     Have a colonoscopy at age 50, or have a yearly FIT test (stool test). These exams will check for colon cancer.      Talk with your health care provider about whether or not a prostate cancer screening test (PSA) is right for you.    You should be tested each year for STDs (sexually transmitted diseases), if you re at risk.     Shots: Get a flu shot each year. Get a tetanus shot every 10 years.     Nutrition:    Eat at least 5 servings of fruits and vegetables daily.     Eat whole-grain bread, whole-wheat pasta and brown rice instead of white grains and rice.     Talk to your provider about Calcium and Vitamin D.     Lifestyle    Exercise for at least 150 minutes a week (30 minutes a day, 5 days a week). This will help you control your weight and prevent disease.     Limit alcohol to one drink per day.     No smoking.     Wear sunscreen to prevent skin cancer.     See your dentist every six months for an exam and cleaning.     See your eye doctor every 1 to 2 years.    Back Spasm (No Trauma)    Spasm of the back muscles can occur after a sudden forceful twisting or bending force (such as in a car accident), after a simple awkward movement, or after lifting something heavy with poor body positioning. In any case, muscle spasm adds to the pain. Sleeping in an awkward position or on a poor quality mattress can also cause this. Some people respond to emotional stress by tensing the muscles of their back.  Pain that  continues may need further evaluation or other types of treatment such as physical therapy.  You don't always need X-rays for the initial evaluation of back pain, unless you had a physical injury such as from a car accident or fall. If your pain continues and doesn't respond to medical treatment, X-rays and other tests may then be done.   Home care    As soon as possible, start sitting or walking again to avoid problems from prolonged bed rest (muscle weakness, worsening back stiffness and pain, blood clots in the legs).    When in bed, try to find a position of comfort. A firm mattress is best. Try lying flat on your back with pillows under your knees. You can also try lying on your side with your knees bent up toward your chest and a pillow between your knees.    Avoid prolonged sitting, long car rides, or travel. This puts more stress on the lower back than standing or walking.     During the first 24 to 72 hours after an injury or flare-up, apply an ice pack to the painful area for 20 minutes, then remove it for 20 minutes. Do this over a period of 60 to 90 minutes or several times a day. This will reduce swelling and pain. Always wrap ice packs in a thin towel.    You can start with ice, then switch to heat. Heat (hot shower, hot bath, or heating pad) reduces pain, and works well for muscle spasms. Apply heat to the painful area for 20 minutes, then remove it for 20 minutes. Do this over a period of 60 to 90 minutes or several times a day. Do not sleep on a heating pad as it can burn or damage skin.    Alternate ice and heat therapies.    Be aware of safe lifting methods and do not lift anything over 15 pounds until all the pain is gone.  Gentle stretching will help your back heal faster. Do this simple routine 2 to 3 times a day until your back is feeling better.    Lie on your back with your knees bent and both feet on the ground    Slowly raise your left knee to your chest as you flatten your lower back  against the floor. Hold for 20 to 30 seconds.    Relax and repeat the exercise with your right knee.    Do 2 to 3 of these exercises for each leg.    Repeat, hugging both knees to your chest at the same time.    Do not bounce, but use a gentle pull.  Medicines  Talk to your doctor before using medicine, especially if you have other medical problems or are taking other medicines.  You may use acetaminophen or ibuprofen to control pain, unless your healthcare provider prescribed another pain medicine. If you have a chronic condition such as diabetes, liver or kidney disease, stomach ulcer, or gastrointestinal bleeding, or are taking blood thinners, talk with your healthcare provider before taking any medicines.  Be careful if you are given prescription pain medicine, narcotics, or medicine for muscle spasm. They can cause drowsiness, affect your coordination, reflexes, or judgment. Do not drive or operate heavy machinery when taking these medicines. Take pain medicine only as prescribed by your healthcare provider.  Follow-up care  Follow up with your doctor, or as advised. Physical therapy or further tests may be needed.  If X-rays were taken, they may be reviewed by a radiologist. You will be notified of any new findings that may affect your care.  Call 911  Seek emergency medical care if any of these occur:    Trouble breathing    Confusion    Drowsiness or trouble awakening    Fainting or loss of consciousness    Rapid or very slow heart rate    Loss of bowel or bladder control  When to seek medical advice  Call your healthcare provider right away if any of these occur:    Pain becomes worse or spreads to your legs    Weakness or numbness in one or both legs    Numbness in the groin or genital area    Unexplained fever over 100.4 F (38.0 C)    Burning or pain when passing urine  Date Last Reviewed: 6/1/2016 2000-2017 [employer]. [address] [place] [address], [place] [address]. All rights reserved. This  information is not intended as a substitute for professional medical care. Always follow your healthcare professional's instructions.

## 2018-03-30 NOTE — MR AVS SNAPSHOT
After Visit Summary   3/30/2018    Beny Jerome    MRN: [id number]           Patient Information     Date Of Birth          1960        Visit Information        Provider Department      3/30/2018 8:40 AM Mavis Arriaga PA-C [facility] [place]        Today's Diagnoses     Routine general medical examination at a health care facility    -  1    Coronary artery disease involving native heart without angina pectoris, unspecified vessel or lesion type        Hyperlipidemia LDL goal <100        Snoring        Screening for prostate cancer        Hypercholesterolemia        Back muscle spasm          Care Instructions      Preventive Health Recommendations  Male Ages 50 - 64    Yearly exam:             See your health care provider every year in order to  o   Review health changes.   o   Discuss preventive care.    o   Review your medicines if your doctor has prescribed any.     Have a cholesterol test every 5 years, or more frequently if you are at risk for high cholesterol/heart disease.     Have a diabetes test (fasting glucose) every three years. If you are at risk for diabetes, you should have this test more often.     Have a colonoscopy at age 50, or have a yearly FIT test (stool test). These exams will check for colon cancer.      Talk with your health care provider about whether or not a prostate cancer screening test (PSA) is right for you.    You should be tested each year for STDs (sexually transmitted diseases), if you re at risk.     Shots: Get a flu shot each year. Get a tetanus shot every 10 years.     Nutrition:    Eat at least 5 servings of fruits and vegetables daily.     Eat whole-grain bread, whole-wheat pasta and brown rice instead of white grains and rice.     Talk to your provider about Calcium and Vitamin D.     Lifestyle    Exercise for at least 150 minutes a week (30 minutes a day, 5 days a week). This will help you control your weight and prevent disease.      Limit alcohol to one drink per day.     No smoking.     Wear sunscreen to prevent skin cancer.     See your dentist every six months for an exam and cleaning.     See your eye doctor every 1 to 2 years.    Back Spasm (No Trauma)    Spasm of the back muscles can occur after a sudden forceful twisting or bending force (such as in a car accident), after a simple awkward movement, or after lifting something heavy with poor body positioning. In any case, muscle spasm adds to the pain. Sleeping in an awkward position or on a poor quality mattress can also cause this. Some people respond to emotional stress by tensing the muscles of their back.  Pain that continues may need further evaluation or other types of treatment such as physical therapy.  You don't always need X-rays for the initial evaluation of back pain, unless you had a physical injury such as from a car accident or fall. If your pain continues and doesn't respond to medical treatment, X-rays and other tests may then be done.   Home care    As soon as possible, start sitting or walking again to avoid problems from prolonged bed rest (muscle weakness, worsening back stiffness and pain, blood clots in the legs).    When in bed, try to find a position of comfort. A firm mattress is best. Try lying flat on your back with pillows under your knees. You can also try lying on your side with your knees bent up toward your chest and a pillow between your knees.    Avoid prolonged sitting, long car rides, or travel. This puts more stress on the lower back than standing or walking.     During the first 24 to 72 hours after an injury or flare-up, apply an ice pack to the painful area for 20 minutes, then remove it for 20 minutes. Do this over a period of 60 to 90 minutes or several times a day. This will reduce swelling and pain. Always wrap ice packs in a thin towel.    You can start with ice, then switch to heat. Heat (hot shower, hot bath, or heating pad) reduces  pain, and works well for muscle spasms. Apply heat to the painful area for 20 minutes, then remove it for 20 minutes. Do this over a period of 60 to 90 minutes or several times a day. Do not sleep on a heating pad as it can burn or damage skin.    Alternate ice and heat therapies.    Be aware of safe lifting methods and do not lift anything over 15 pounds until all the pain is gone.  Gentle stretching will help your back heal faster. Do this simple routine 2 to 3 times a day until your back is feeling better.    Lie on your back with your knees bent and both feet on the ground    Slowly raise your left knee to your chest as you flatten your lower back against the floor. Hold for 20 to 30 seconds.    Relax and repeat the exercise with your right knee.    Do 2 to 3 of these exercises for each leg.    Repeat, hugging both knees to your chest at the same time.    Do not bounce, but use a gentle pull.  Medicines  Talk to your doctor before using medicine, especially if you have other medical problems or are taking other medicines.  You may use acetaminophen or ibuprofen to control pain, unless your healthcare provider prescribed another pain medicine. If you have a chronic condition such as diabetes, liver or kidney disease, stomach ulcer, or gastrointestinal bleeding, or are taking blood thinners, talk with your healthcare provider before taking any medicines.  Be careful if you are given prescription pain medicine, narcotics, or medicine for muscle spasm. They can cause drowsiness, affect your coordination, reflexes, or judgment. Do not drive or operate heavy machinery when taking these medicines. Take pain medicine only as prescribed by your healthcare provider.  Follow-up care  Follow up with your doctor, or as advised. Physical therapy or further tests may be needed.  If X-rays were taken, they may be reviewed by a radiologist. You will be notified of any new findings that may affect your care.  Call 911  Seek  emergency medical care if any of these occur:    Trouble breathing    Confusion    Drowsiness or trouble awakening    Fainting or loss of consciousness    Rapid or very slow heart rate    Loss of bowel or bladder control  When to seek medical advice  Call your healthcare provider right away if any of these occur:    Pain becomes worse or spreads to your legs    Weakness or numbness in one or both legs    Numbness in the groin or genital area    Unexplained fever over 100.4 F (38.0 C)    Burning or pain when passing urine  Date Last Reviewed: 6/1/2016 2000-2017 [email or web address]. [address] [place] [address]. All rights reserved. This information is not intended as a substitute for professional medical care. Always follow your healthcare professional's instructions.                Follow-ups after your visit        Additional Services     PHYSICAL THERAPY REFERRAL       *This therapy referral will be filtered to a centralized scheduling office at [facility] and the patient will receive a call to schedule an appointment at a [place] location most convenient for them. *     [facility] provides Physical Therapy evaluation and treatment and many specialty services across the [place] system.  If requesting a specialty program, please choose from the list below.    If you have not heard from the scheduling office within 2 business days, please call [phone] for all locations, with the exception of [place], please call [phone] and [facility], please call [phone]  Treatment: Evaluation & Treatment  Special Instructions/Modalities: None  Special Programs: None    Please be aware that coverage of these services is subject to the terms and limitations of your health insurance plan.  Call member services at your health plan with any benefit or coverage questions.      **Note to Provider:  If you are referring outside of [place] for the therapy  "appointment, please list the name of the location in the \"special instructions\" above, print the referral and give to the patient to schedule the appointment.            SLEEP EVALUATION & MANAGEMENT REFERRAL - [facility]  [phone] (Age 18 and up)       Please be aware that coverage of these services is subject to the terms and limitations of your health insurance plan.  Call member services at your health plan with any benefit or coverage questions.      Please bring the following to your appointment:    >>   List of current medications   >>   This referral request   >>   Any documents/labs given to you for this referral                      Follow-up notes from your care team     Return in about 1 month (around 4/30/2018), or if symptoms worsen or fail to improve, for If not improving.      Future tests that were ordered for you today     Open Future Orders        Priority Expected Expires Ordered    SLEEP EVALUATION & MANAGEMENT REFERRAL - [facility]  [phone] (Age 18 and up) Routine  3/30/2019 3/30/2018            Who to contact     If you have questions or need follow up information about today's clinic visit or your schedule please contact [facility] directly at [phone].  Normal or non-critical lab and imaging results will be communicated to you by MyChart, letter or phone within 4 business days after the clinic has received the results. If you do not hear from us within 7 days, please contact the clinic through MyChart or phone. If you have a critical or abnormal lab result, we will notify you by phone as soon as possible.  Submit refill requests through [employer] or call your pharmacy and they will forward the refill request to us. Please allow 3 business days for your refill to be completed.          Additional Information About Your Visit        Open [facility] Information     [employer] gives you secure access to your " "electronic health record. If you see a primary care provider, you can also send messages to your care team and make appointments. If you have questions, please call your primary care clinic.  If you do not have a primary care provider, please call [phone] and they will assist you.        Care EveryWhere ID     This is your Care EveryWhere ID. This could be used by other organizations to access your [facility] medical records  [id number]        Your Vitals Were     Pulse Temperature Height Pulse Oximetry BMI (Body Mass Index)       84 98.5  F (36.9  C) (Oral) 6' 1\" (1.854 m) 98% 28.63 kg/m2        Blood Pressure from Last 3 Encounters:   03/30/18 132/80   03/15/17 128/78   09/10/15 136/72    Weight from Last 3 Encounters:   03/30/18 217 lb (98.4 kg)   03/15/17 213 lb 12.8 oz (97 kg)   09/10/15 208 lb (94.3 kg)              We Performed the Following     CBC with platelets and differential     Comprehensive metabolic panel (BMP + Alb, Alk Phos, ALT, AST, Total. Bili, TP)     Lipid panel reflex to direct LDL Fasting     PHYSICAL THERAPY REFERRAL     PROSTATE SPEC ANTIGEN SCREEN     TSH with free T4 reflex          Today's Medication Changes          These changes are accurate as of 3/30/18  9:36 AM.  If you have any questions, ask your nurse or doctor.               Start taking these medicines.        Dose/Directions    cyclobenzaprine 5 MG tablet   Commonly known as:  FLEXERIL   Used for:  Back muscle spasm   Started by:  Mavis Arriaga, PAKJ        Dose:  5 mg   Take 1 tablet (5 mg) by mouth 3 times daily as needed for muscle spasms   Quantity:  30 tablet   Refills:  0            Where to get your medicines      These medications were sent to [id number] - [place], [place] - [address]  [address] [facility] [address] [place] [address]     Phone:  [phone]     atorvastatin 40 MG tablet    cyclobenzaprine 5 MG tablet                Primary Care Provider Office Phone # Fax #    Khushi EN Delvalle " [phone]       [address] [facility] [address]        Equal Access to Services     TORITO ZAVALA : Hadii aad ku hadcindi Posadas, wamarkosda luqdarrius, qaybta kaalmada maria g, rosa armstrong shalondaoksana renae laFrankradha mylene. So [facility] [phone].    ATENCIÓN: Si habla español, tiene a slade disposición servicios gratuitos de asistencia lingüística. Llame al [phone].    We comply with applicable federal civil rights laws and [place] laws. We do not discriminate on the basis of race, color, national origin, age, disability, sex, sexual orientation, or gender identity.            Thank you!     Thank you for choosing [facility]  for your care. Our goal is always to provide you with excellent care. Hearing back from our patients is one way we can continue to improve our services. Please take a few minutes to complete the written survey that you may receive in the mail after your visit with us. Thank you!             Your Updated Medication List - Protect others around you: Learn how to safely use, store and throw away your medicines at www.disposemymeds.org.          This list is accurate as of 3/30/18  9:36 AM.  Always use your most recent med list.                   Brand Name Dispense Instructions for use Diagnosis    ASPIRIN ADULT LOW STRENGTH PO      Take 81 mg by mouth daily        atorvastatin 40 MG tablet    LIPITOR    90 tablet    Take 1 tablet (40 mg) by mouth daily    Coronary artery disease involving native heart without angina pectoris, unspecified vessel or lesion type, Hypercholesterolemia       cyclobenzaprine 5 MG tablet    FLEXERIL    30 tablet    Take 1 tablet (5 mg) by mouth 3 times daily as needed for muscle spasms    Back muscle spasm       EXCEDRIN MIGRAINE 250-250-65 MG per tablet   Generic drug:  aspirin-acetaminophen-caffeine     80 tablet    Take 1 tablet by mouth every 6 hours as needed for headaches        PRILOSEC PO      Take 20 mg by mouth daily  Unsure of dosage

## 2018-04-02 NOTE — PROGRESS NOTES
Sandra Swan ,    The results from your recent lab work are within normal limits.    -Glucose is slight elevated and may be sign of early diabetes (prediabetes). ADVISE:: low carbohydrate diet, exercise, try to lose weight (if necessary) and recheck glucose in 12 months. (GLU,A1C, DX: prediabetes)      Thank you for choosing [place] for your health care needs,      Mavis Arriaga PA-C    The 10-year ASCVD risk score (Zhanna PADMINI Jr, et al., 2013) is: 7.4%    Values used to calculate the score:      Age: 58 years      Sex: Male      Is Non- : No      Diabetic: No      Tobacco smoker: No      Systolic Blood Pressure: 132 mmHg      Is BP treated: No      HDL Cholesterol: 51 mg/dL      Total Cholesterol: 178 mg/dL

## 2018-04-11 ENCOUNTER — THERAPY VISIT (OUTPATIENT)
Dept: PHYSICAL THERAPY | Facility: CLINIC | Age: 58
End: 2018-04-11
Payer: COMMERCIAL

## 2018-04-11 DIAGNOSIS — M54.50 BILATERAL LOW BACK PAIN WITHOUT SCIATICA, UNSPECIFIED CHRONICITY: Primary | ICD-10-CM

## 2018-04-11 PROCEDURE — 97112 NEUROMUSCULAR REEDUCATION: CPT | Mod: GP | Performed by: PHYSICAL THERAPIST

## 2018-04-11 PROCEDURE — 97161 PT EVAL LOW COMPLEX 20 MIN: CPT | Mod: GP | Performed by: PHYSICAL THERAPIST

## 2018-04-11 PROCEDURE — 97110 THERAPEUTIC EXERCISES: CPT | Mod: GP | Performed by: PHYSICAL THERAPIST

## 2018-04-11 NOTE — PROGRESS NOTES
[facility] Initial Evaluation  Subjective:  Physical Therapy Initial Evaluation   18   Precautions/Restrictions/MD instructions: PT eval and treat   Therapist Impression:   Pt is a 59 y/o male, with 2 weel history of low back pain/muscle spasms. Pt presents with pain, decreased ROM/mobility, poor posture and decreased strength. These impairments limit their ability to walking, standing, and sitting prolonged. Skilled PT services necessary in order to reduce impairments and improve independent function.    Subjective:   Chief Complaint: Low back pain, 2 weeks ago was dressing and collapsed. Acute on chronic issues.  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; push/pull, carry, lift   Current HEP/exercise regimen: none Patient's goals: painfree and prevent reoccurrences   Medications: cholesterol, heart burn   General health as reported by patient: good   Return to MD: as needed   Red Flags: none    HPI                    Objective:  LUMBAR:    Posture: rounded low back in sitting, feels better in decline position     Dural Signs:   L R   SLR + +   Other:    - TA activation: poor       AROM: (Major, Moderate, Minimal or Nil loss)  Movement Loss Lev Mod Min Nil Pain   Flexion  X   Increases pain   Extension  X   No pain   Side Bend L   X  No pain   Side Bend R   X  R sided pain       Lumbar Mobility/Spring Testing: Tender to spring PA testing L3-S1    Palpation:  Hypertonic to lumbar and thoracic paraspinals      System    Physical Exam    General     ROS    Assessment/Plan:    Patient is a 58 year old male with lumbar complaints.    Patient has the following significant findings with corresponding treatment plan.                Diagnosis 1:  LBP  Pain -  hot/cold therapy, manual therapy, splint/taping/bracing/orthotics, self management, education, directional preference exercise and home program  Decreased ROM/flexibility - manual therapy and therapeutic exercise  Decreased joint mobility - manual therapy and therapeutic exercise  Decreased strength - therapeutic exercise and therapeutic activities  Inflammation - cold therapy and self management/home program  Impaired muscle performance - neuro re-education  Decreased function - therapeutic activities  Impaired posture - neuro re-education  Instability -  Therapeutic Activity  Therapeutic Exercise    Therapy Evaluation Codes:   1) History comprised of:   Personal factors that impact the plan of care:      None.    Comorbidity factors that impact the plan of care are:      Migraines/headaches.     Medications impacting care: Anti-inflammatory and Muscle relaxant.  2) Examination of Body Systems comprised of:   Body structures and functions that impact the plan of care:      Lumbar spine.   Activity limitations that impact the plan of care are:      Bending, Sitting, Working and Sleeping.  3) Clinical presentation characteristics are:   Stable/Uncomplicated.  4) Decision-Making    Low complexity using standardized patient assessment instrument and/or measureable assessment of functional outcome.  Cumulative Therapy Evaluation is: Low complexity.    Previous and current functional limitations:  (See Goal Flow Sheet for this information)    Short term and Long term goals: (See Goal Flow Sheet for this information)     Communication ability:  Patient appears to be able to clearly communicate and understand verbal and written  communication and follow directions correctly.  Treatment Explanation - The following has been discussed with the patient:   RX ordered/plan of care  Anticipated outcomes  Possible risks and side effects  This patient would benefit from PT intervention to resume normal activities.   Rehab potential is good.    Frequency:  1 X week, once daily  Duration:  for 6 weeks  Discharge Plan:  Achieve all LTG.  Independent in home treatment program.  Reach maximal therapeutic benefit.    Please refer to the daily flowsheet for treatment today, total treatment time and time spent performing 1:1 timed codes.

## 2018-04-11 NOTE — MR AVS SNAPSHOT
After Visit Summary   4/11/2018    Beny Jerome    MRN: [id number]           Patient Information     Date Of Birth          1960        Visit Information        Provider Department      4/11/2018 4:10 PM Nydia Mondragon PT [facility]        Today's Diagnoses     Bilateral low back pain without sciatica, unspecified chronicity    -  1       Follow-ups after your visit        Your next 10 appointments already scheduled     Apr 20, 2018  1:50 PM CDT   [facility] Spine with Nydia Mondragon PT   [facility] ([facility])    [address] [facility] [address]   [phone]            Apr 30, 2018  4:00 PM CDT   Office Visit with Mavis Arriaga PA-C   [facility]    [address] [place] [id number] [phone]           Bring a current list of meds and any records pertaining to this visit. For Physicals, please bring immunization records and any forms needing to be filled out. Please arrive 10 minutes early to complete paperwork.              Who to contact     If you have questions or need follow up information about today's clinic visit or your schedule please contact [facility] directly at [phone].  Normal or non-critical lab and imaging results will be communicated to you by MyChart, letter or phone within 4 business days after the clinic has received the results. If you do not hear from us within 7 days, please contact the clinic through MyChart or phone. If you have a critical or abnormal lab result, we will notify you by phone as soon as possible.  Submit refill requests through [employer] or call your pharmacy and they will forward the refill request to us. Please allow 3 business days for your refill to be completed.          Additional Information About Your Visit        Zachary Prellhart Information     [employer] gives you secure access to your  electronic health record. If you see a primary care provider, you can also send messages to your care team and make appointments. If you have questions, please call your primary care clinic.  If you do not have a primary care provider, please call [phone] and they will assist you.        Care EveryWhere ID     This is your Care EveryWhere ID. This could be used by other organizations to access your [place] medical records  [id number]         Blood Pressure from Last 3 Encounters:   03/30/18 132/80   03/15/17 128/78   09/10/15 136/72    Weight from Last 3 Encounters:   03/30/18 98.4 kg (217 lb)   03/15/17 97 kg (213 lb 12.8 oz)   09/10/15 94.3 kg (208 lb)              We Performed the Following     HC PT EVAL, LOW COMPLEXITY     [facility] INITIAL EVAL REPORT     NEUROMUSCULAR RE-EDUCATION     THERAPEUTIC EXERCISES        Primary Care Provider Office Phone # Fax #    Khushi Araujo APRN [phone]       [address] [place] [id number]        Equal Access to Services     [facility]: Hadii aad ku hadasho Soomaali, waaxda luqadaha, qaybta kaalmada adeegyada, waxay clari hayradha rosado . So [facility] [phone].    ATENCIÓN: Si habla español, tiene a slade disposición servicios gratuitos de asistencia lingüística. Llame al [phone].    We comply with applicable federal civil rights laws and [place] laws. We do not discriminate on the basis of race, color, national origin, age, disability, sex, sexual orientation, or gender identity.            Thank you!     Thank you for choosing [facility]  for your care. Our goal is always to provide you with excellent care. Hearing back from our patients is one way we can continue to improve our services. Please take a few minutes to complete the written survey that you may receive in the mail after your visit with us. Thank you!             Your Updated Medication List - Protect others around you:  Learn how to safely use, store and throw away your medicines at www.disposemymeds.org.          This list is accurate as of 4/11/18  4:50 PM.  Always use your most recent med list.                   Brand Name Dispense Instructions for use Diagnosis    ASPIRIN ADULT LOW STRENGTH PO      Take 81 mg by mouth daily        atorvastatin 40 MG tablet    LIPITOR    90 tablet    Take 1 tablet (40 mg) by mouth daily    Coronary artery disease involving native heart without angina pectoris, unspecified vessel or lesion type, Hypercholesterolemia       cyclobenzaprine 5 MG tablet    FLEXERIL    30 tablet    Take 1 tablet (5 mg) by mouth 3 times daily as needed for muscle spasms    Back muscle spasm       EXCEDRIN MIGRAINE 250-250-65 MG per tablet   Generic drug:  aspirin-acetaminophen-caffeine     80 tablet    Take 1 tablet by mouth every 6 hours as needed for headaches        PRILOSEC PO      Take 20 mg by mouth daily Unsure of dosage

## 2018-04-20 ENCOUNTER — THERAPY VISIT (OUTPATIENT)
Dept: PHYSICAL THERAPY | Facility: CLINIC | Age: 58
End: 2018-04-20
Payer: COMMERCIAL

## 2018-04-20 DIAGNOSIS — M54.50 BILATERAL LOW BACK PAIN WITHOUT SCIATICA, UNSPECIFIED CHRONICITY: ICD-10-CM

## 2018-04-20 PROCEDURE — 97140 MANUAL THERAPY 1/> REGIONS: CPT | Mod: GP | Performed by: PHYSICAL THERAPIST

## 2018-04-20 PROCEDURE — 97110 THERAPEUTIC EXERCISES: CPT | Mod: GP | Performed by: PHYSICAL THERAPIST

## 2018-04-27 ENCOUNTER — THERAPY VISIT (OUTPATIENT)
Dept: PHYSICAL THERAPY | Facility: CLINIC | Age: 58
End: 2018-04-27
Payer: COMMERCIAL

## 2018-04-27 DIAGNOSIS — M54.50 BILATERAL LOW BACK PAIN WITHOUT SCIATICA, UNSPECIFIED CHRONICITY: ICD-10-CM

## 2018-04-27 PROCEDURE — 97140 MANUAL THERAPY 1/> REGIONS: CPT | Mod: GP | Performed by: PHYSICAL THERAPIST

## 2018-04-27 PROCEDURE — 97035 APP MDLTY 1+ULTRASOUND EA 15: CPT | Mod: GP | Performed by: PHYSICAL THERAPIST

## 2018-04-27 PROCEDURE — 97110 THERAPEUTIC EXERCISES: CPT | Mod: GP | Performed by: PHYSICAL THERAPIST

## 2018-04-30 ENCOUNTER — OFFICE VISIT (OUTPATIENT)
Dept: FAMILY MEDICINE | Facility: CLINIC | Age: 58
End: 2018-04-30
Payer: COMMERCIAL

## 2018-04-30 VITALS
HEART RATE: 78 BPM | WEIGHT: 218 LBS | DIASTOLIC BLOOD PRESSURE: 68 MMHG | SYSTOLIC BLOOD PRESSURE: 118 MMHG | HEIGHT: 73 IN | OXYGEN SATURATION: 97 % | TEMPERATURE: 98.8 F | BODY MASS INDEX: 28.89 KG/M2

## 2018-04-30 DIAGNOSIS — M54.50 ACUTE BILATERAL LOW BACK PAIN WITHOUT SCIATICA: Primary | ICD-10-CM

## 2018-04-30 PROCEDURE — 99213 OFFICE O/P EST LOW 20 MIN: CPT | Performed by: PHYSICIAN ASSISTANT

## 2018-04-30 NOTE — PATIENT INSTRUCTIONS
Please continue with physical therapy.      Please seek more immediate care if symptoms change or worsen in any way.

## 2018-04-30 NOTE — PROGRESS NOTES
"  SUBJECTIVE:                                                    Beny Jerome is a 58 year old male who presents to clinic today for the following health issues:    Lab results from Physical - does not look at MyChart.    Follow up on back - went to PT, last OV Friday - changed routine up and has been pretty good. Currently sore but has been working all day. Is better than last week.     Patient reports that initially the physical therapy was not helpful.  He states that last week the back pain was still as bad as it was initially.  He states that the physical therapist did change up the stretching and now he thinks it is mildly better.  He has had some tingling down either leg.  He says that this has only occurred a couple of days since he was first seen.  He has not had any loss of bladder of bowel control.  He also denies any groin paraesthesias.      He rates the pain as a 1/10.  He says that earlier today it was a 3/10.  Last week it was 5-6/10 on the pain scale.    He is a , and says that the symptoms do seem to get worse as he works.      Problem list and histories reviewed & adjusted, as indicated.  Additional history: as documented      ROS:  Constitutional, HEENT, cardiovascular, pulmonary, GI, , musculoskeletal, neuro, skin, endocrine and psych systems are negative, except as otherwise noted.    OBJECTIVE:                                                    /68 (BP Location: Left arm, Patient Position: Chair, Cuff Size: Adult Large)  Pulse 78  Temp 98.8  F (37.1  C) (Oral)  Ht 6' 1\" (1.854 m)  Wt 218 lb (98.9 kg)  SpO2 97%  BMI 28.76 kg/m2  Body mass index is 28.76 kg/(m^2).  GENERAL: healthy, alert and no distress  EYES: Eyes grossly normal to inspection, PERRL and conjunctivae and sclerae normal  MS: no gross musculoskeletal defects noted, no edema  NEURO: Normal strength and tone, mentation intact and speech normal  BACK: no CVA tenderness, no paralumbar tenderness  PSYCH: " mentation appears normal, affect normal/bright    Diagnostic Test Results:  none      ASSESSMENT/PLAN:                                                      Beny was seen today for recheck.    Diagnoses and all orders for this visit:    Acute bilateral low back pain without sciatica    - Patient encouraged to continue with physical therapy as that seems to be improving symptoms.    - He will contact the clinic if the symptoms do not continue to improve, will do imaging at that time.   - Patient advised to seek more immediate medical attention if symptoms change or worsen in any way.     -- I have discussed the patient's diagnosis, and my plan of treatment with the patient and/or family. Patient is aware to followup if symptoms do not improve.  Patient has been advised to be seen sooner or seek more immediate care if symptoms change or worsen.  Patient agrees with and understands the plan today.     See Patient Instructions:  Please continue with physical therapy.      Please seek more immediate care if symptoms change or worsen in any way.         Mavis Arriaga PA-C    [facility]

## 2018-04-30 NOTE — NURSING NOTE
"Chief Complaint   Patient presents with     RECHECK       Initial /68 (BP Location: Left arm, Patient Position: Chair, Cuff Size: Adult Large)  Pulse 78  Temp 98.8  F (37.1  C) (Oral)  Ht 6' 1\" (1.854 m)  Wt 218 lb (98.9 kg)  SpO2 97%  BMI 28.76 kg/m2 Estimated body mass index is 28.76 kg/(m^2) as calculated from the following:    Height as of this encounter: 6' 1\" (1.854 m).    Weight as of this encounter: 218 lb (98.9 kg).  Medication Reconciliation: complete   Csaba Mlnarik CMA    "

## 2018-04-30 NOTE — MR AVS SNAPSHOT
After Visit Summary   4/30/2018    Beny Jerome    MRN: [id number]           Patient Information     Date Of Birth          1960        Visit Information        Provider Department      4/30/2018 4:00 PM Mavis Arriaga PA-C [facility]        Care Instructions    Please continue with physical therapy.      Please seek more immediate care if symptoms change or worsen in any way.                Follow-ups after your visit        Follow-up notes from your care team     Return in about 1 week (around 5/7/2018).      Who to contact     If you have questions or need follow up information about today's clinic visit or your schedule please contact [facility] directly at [phone].  Normal or non-critical lab and imaging results will be communicated to you by MyChart, letter or phone within 4 business days after the clinic has received the results. If you do not hear from us within 7 days, please contact the clinic through [employer] or phone. If you have a critical or abnormal lab result, we will notify you by phone as soon as possible.  Submit refill requests through [employer] or call your pharmacy and they will forward the refill request to us. Please allow 3 business days for your refill to be completed.          Additional Information About Your Visit        MyChart Information     [employer] gives you secure access to your electronic health record. If you see a primary care provider, you can also send messages to your care team and make appointments. If you have questions, please call your primary care clinic.  If you do not have a primary care provider, please call [phone] and they will assist you.        Care EveryWhere ID     This is your Care EveryWhere ID. This could be used by other organizations to access your [place] medical records  [id number]        Your Vitals Were     Pulse Temperature Height Pulse Oximetry BMI (Body Mass Index)       78 98.8  F  "(37.1  C) (Oral) 6' 1\" (1.854 m) 97% 28.76 kg/m2        Blood Pressure from Last 3 Encounters:   04/30/18 118/68   03/30/18 132/80   03/15/17 128/78    Weight from Last 3 Encounters:   04/30/18 218 lb (98.9 kg)   03/30/18 217 lb (98.4 kg)   03/15/17 213 lb 12.8 oz (97 kg)              Today, you had the following     No orders found for display       Primary Care Provider Office Phone # Fax #    EN Rubio [phone]       [address] [facility] [address]        Equal Access to Services     [facility] : Hadii zahraa swartzo Sodeann, waaxda luqadaha, qaybta kaalmada adeegyada, rosa rosado . So [facility] [phone].    ATENCIÓN: Si habla español, tiene a slade disposición servicios gratuitos de asistencia lingüística. Llame al [phone].    We comply with applicable federal civil rights laws and [place] laws. We do not discriminate on the basis of race, color, national origin, age, disability, sex, sexual orientation, or gender identity.            Thank you!     Thank you for choosing [facility]  for your care. Our goal is always to provide you with excellent care. Hearing back from our patients is one way we can continue to improve our services. Please take a few minutes to complete the written survey that you may receive in the mail after your visit with us. Thank you!             Your Updated Medication List - Protect others around you: Learn how to safely use, store and throw away your medicines at www.disposemymeds.org.          This list is accurate as of 4/30/18  4:49 PM.  Always use your most recent med list.                   Brand Name Dispense Instructions for use Diagnosis    ASPIRIN ADULT LOW STRENGTH PO      Take 81 mg by mouth daily        atorvastatin 40 MG tablet    LIPITOR    90 tablet    Take 1 tablet (40 mg) by mouth daily    Coronary artery disease involving native heart without angina pectoris, " unspecified vessel or lesion type, Hypercholesterolemia       cyclobenzaprine 5 MG tablet    FLEXERIL    30 tablet    Take 1 tablet (5 mg) by mouth 3 times daily as needed for muscle spasms    Back muscle spasm       EXCEDRIN MIGRAINE 250-250-65 MG per tablet   Generic drug:  aspirin-acetaminophen-caffeine     80 tablet    Take 1 tablet by mouth every 6 hours as needed for headaches        PRILOSEC PO      Take 20 mg by mouth daily Unsure of dosage

## 2018-05-18 DIAGNOSIS — E78.00 HYPERCHOLESTEROLEMIA: ICD-10-CM

## 2018-05-18 DIAGNOSIS — I25.10 CORONARY ARTERY DISEASE INVOLVING NATIVE HEART WITHOUT ANGINA PECTORIS, UNSPECIFIED VESSEL OR LESION TYPE: ICD-10-CM

## 2018-05-18 RX ORDER — ATORVASTATIN CALCIUM 40 MG/1
40 TABLET, FILM COATED ORAL DAILY
Qty: 90 TABLET | Refills: 3 | Status: SHIPPED | OUTPATIENT
Start: 2018-05-18 | End: 2019-07-19

## 2019-02-28 ENCOUNTER — TRANSFERRED RECORDS (OUTPATIENT)
Dept: HEALTH INFORMATION MANAGEMENT | Facility: CLINIC | Age: 59
End: 2019-02-28

## 2019-05-23 DIAGNOSIS — I25.10 CORONARY ARTERY DISEASE INVOLVING NATIVE HEART WITHOUT ANGINA PECTORIS, UNSPECIFIED VESSEL OR LESION TYPE: ICD-10-CM

## 2019-05-23 DIAGNOSIS — E78.00 HYPERCHOLESTEROLEMIA: ICD-10-CM

## 2019-05-23 NOTE — TELEPHONE ENCOUNTER
"Requested Prescriptions   Pending Prescriptions Disp Refills     atorvastatin (LIPITOR) 40 MG tablet [Pharmacy Med Name: ATORVASTATIN 40 MG TABLET]  Last Written Prescription Date:  05/18/2018  Last Fill Quantity: 90 tablet,  # refills: 3    Last Office Visit: 4/30/2018 Mavis Arriaga PA-C       Future Office Visit:      90 tablet 3     Sig: TAKE 1 TABLET (40 MG) BY MOUTH DAILY       Statins Protocol Failed - 5/23/2019  1:19 AM        Failed - LDL on file in past 12 months     Recent Labs   Lab Test 03/30/18  0942   *             Failed - Recent (12 mo) or future (30 days) visit within the authorizing provider's specialty     Patient had office visit in the last 12 months or has a visit in the next 30 days with authorizing provider or within the authorizing provider's specialty.  See \"Patient Info\" tab in inbasket, or \"Choose Columns\" in Meds & Orders section of the refill encounter.              Passed - No abnormal creatine kinase in past 12 months     No lab results found.             Passed - Medication is active on med list        Passed - Patient is age 18 or older          "

## 2019-05-23 NOTE — TELEPHONE ENCOUNTER
Routing refill request to provider for review/approval because:  Sheila given x1 and patient did not follow up, please advise  Leelee Davis RN  [facility]

## 2019-05-28 RX ORDER — ATORVASTATIN CALCIUM 40 MG/1
40 TABLET, FILM COATED ORAL DAILY
Qty: 30 TABLET | Refills: 0 | Status: SHIPPED | OUTPATIENT
Start: 2019-05-28 | End: 2019-07-01

## 2019-05-28 NOTE — TELEPHONE ENCOUNTER
Keep scheduled follow-up appointments     One month grisel given, refill sent.  Patient needs a physical and labs done for any further refills.  Please help him get scheduled.

## 2019-07-01 DIAGNOSIS — E78.00 HYPERCHOLESTEROLEMIA: ICD-10-CM

## 2019-07-01 DIAGNOSIS — I25.10 CORONARY ARTERY DISEASE INVOLVING NATIVE HEART WITHOUT ANGINA PECTORIS, UNSPECIFIED VESSEL OR LESION TYPE: ICD-10-CM

## 2019-07-01 RX ORDER — ATORVASTATIN CALCIUM 40 MG/1
TABLET, FILM COATED ORAL
Qty: 30 TABLET | Refills: 0 | Status: SHIPPED | OUTPATIENT
Start: 2019-07-01 | End: 2019-07-22

## 2019-07-01 NOTE — TELEPHONE ENCOUNTER
Routing refill request to provider for review/approval because:  Labs not current:  LDL  Patient needs to be seen because it has been more than 1 year since last office visit.  Provider has not prescribed medication requested please address at upcoming appointment on 7/22/2019.    MCKAY TuckerN, RN  [facility] [facility]

## 2019-07-01 NOTE — TELEPHONE ENCOUNTER
"Requested Prescriptions   Pending Prescriptions Disp Refills     atorvastatin (LIPITOR) 40 MG tablet [Pharmacy Med Name: ATORVASTATIN 40 MG TABLET]          Last Written Prescription Date:  5.28.19  Last Fill Quantity: 30 tablet,  # refills: 0   Last office visit: 4/30/2018 with prescribing provider:  JESSICA Newell             Future Office Visit:   Next 5 appointments (look out 90 days)    Jul 22, 2019  7:40 AM CDT  PHYSICAL with Nhan Ramos MD  [facility] ([address] [address]  [phone]            30 tablet 0     Sig: TAKE 1 TABLET BY MOUTH EVERY DAY       Statins Protocol Failed - 7/1/2019  1:28 AM        Failed - LDL on file in past 12 months     Recent Labs   Lab Test 03/30/18  0942   *             Passed - No abnormal creatine kinase in past 12 months     No lab results found.             Passed - Recent (12 mo) or future (30 days) visit within the authorizing provider's specialty     Patient had office visit in the last 12 months or has a visit in the next 30 days with authorizing provider or within the authorizing provider's specialty.  See \"Patient Info\" tab in inbasket, or \"Choose Columns\" in Meds & Orders section of the refill encounter.              Passed - Medication is active on med list        Passed - Patient is age 18 or older        "

## 2019-07-01 NOTE — TELEPHONE ENCOUNTER
Left non-detailed message for patient to call back.  Please schedule follow up when patient calls back.  (see previous notes for details)    Thanks Sheila

## 2019-07-02 NOTE — TELEPHONE ENCOUNTER
Patient says he has enough atorvastatin to last him to his physical appointment with Dr. Ramos on 07/22/2019.      Erika Hurtado  Patient Representative

## 2019-07-19 NOTE — PROGRESS NOTES
"  SUBJECTIVE:   CC: Beny Jerome is an 59 year old male who presents for preventive health visit.     Healthy Habits:    Do you get at least three servings of calcium containing foods daily (dairy, green leafy vegetables, etc.)? yes    Amount of exercise or daily activities, outside of work: walking frequently, weekend kayaking, work is physical     Problems taking medications regularly No    Medication side effects: No    Have you had an eye exam in the past two years? no    Do you see a dentist twice per year? yes    Do you have sleep apnea, excessive snoring or daytime drowsiness?yes    Hyperlipidemia Follow-Up    Are you having any of the following symptoms? (Select all that apply)  No complaints of shortness of breath, chest pain or pressure.  No increased sweating or nausea with activity.  No left-sided neck or arm pain.  No complaints of pain in calves when walking 1-2 blocks.    Are you regularly taking any medication or supplement to lower your cholesterol?   Yes- Lipitor (40mg)    Are you having muscle aches or other side effects that you think could be caused by your cholesterol lowering medication?  No    Beny reports that he's been taking his atorvastatin (40mg) daily without complication. He maintains a fairly physical and activity regimen as well.     Chest Pain  Intermittent nonspecific chest pain which are \"fairly intense\" which radiates up to his neck occasionally -- lasts 15 minutes. His first episode was about a year ago. He had a previous stress test done. Confirms acid reflux episodes, and DOMINGUEZ. Denies sweating, no tobacco usage -- unsure if his chest feels heavy as he doesn't \"think of it that way\".    Today's PHQ-2 Score:   PHQ-2 ( 1999 Pfizer) 7/22/2019 3/15/2017   Q1: Little interest or pleasure in doing things 0 0   Q2: Feeling down, depressed or hopeless 0 0   PHQ-2 Score 0 0     Abuse: Current or Past(Physical, Sexual or Emotional)- No  Do you feel safe in your environment? Yes    Social " "History     Tobacco Use     Smoking status: Never Smoker     Smokeless tobacco: Never Used   Substance Use Topics     Alcohol use: No     Comment: quit 1990     If you drink alcohol do you typically have >3 drinks per day or >7 drinks per week? No                      Last PSA:   PSA   Date Value Ref Range Status   03/30/2018 1.14 0 - 4 ug/L Final     Comment:     Assay Method:  Chemiluminescence using Siemens Vista analyzer       Reviewed orders with patient. Reviewed health maintenance and updated orders accordingly - Yes    Reviewed and updated as needed this visit by clinical staff  Tobacco  Allergies  Meds  Problems  Med Hx  Surg Hx  Fam Hx  Soc Hx          Reviewed and updated as needed this visit by Provider  Tobacco  Allergies  Meds  Problems  Med Hx  Surg Hx  Fam Hx          ROS:  Constitutional, HEENT, cardiovascular, pulmonary, GI, , musculoskeletal, neuro, skin, endocrine and psych systems are negative, except as otherwise noted.    MS - mild tennis elbow symptoms which he attributes to his recent kayaking two days ago.   This document serves as a record of the services and decisions personally performed and made by Nhan Ramos MD. It was created on his behalf by Ferdinand Arreola, a trained medical scribe. The creation of this document is based the provider's statements to the medical scribe.  Scribe Ferdinand Arreola 7:57 AM, July 22, 2019    OBJECTIVE:   /80   Pulse 89   Temp 98  F (36.7  C) (Oral)   Resp 16   Ht 1.854 m (6' 1\")   Wt 95.7 kg (211 lb)   SpO2 96%   BMI 27.84 kg/m    EXAM:  GENERAL: healthy, alert, well nourished, well hydrated, no distress  EYES: Eyes grossly normal to inspection, extraocular movements - intact, and PERRL  HENT: ear canals- normal; TMs- normal; Nose- normal; Mouth- no ulcers, no lesions  NECK: no tenderness, no adenopathy, no asymmetry, no masses, no stiffness; thyroid- normal to palpation  RESP: lungs clear to auscultation - no rales, no rhonchi, no " wheezes  CV: regular rates and rhythm, normal S1 S2, no S3 or S4 and no murmur, no click or rub -  ABDOMEN: soft, mild epigastric tenderness, no hepatosplenomegaly, no masses, normal bowel sounds  MS: extremities- no gross deformities noted, no edema  SKIN: no suspicious lesions, no rashes  BACK: no CVA tenderness, no paralumbar tenderness  - male: testicles- normal, no atrophy, no masses;  no inguinal hernias  RECTAL- male: no masses, no hemorhoids, Prostate- symmetric, no  nodularity, no masses, no hypertrophy  PSYCH: Alert and oriented times 3; speech- coherent , normal rate and volume; able to articulate logical thoughts, able to abstract reason, no tangential thoughts, no hallucinations or delusions, affect- normal  LYMPHATICS: ant. cervical- normal, post. cervical- normal, axillary- normal, supraclavicular- normal, inguinal- normal  NEURO: strength and tone- normal, sensory exam- grossly normal, mentation- intact speech- normal, reflexes- symmetric    EKG: negative Q wave in III and AFS changes, which are new compared to previous results in 2015. question if pain caused by angina.     ASSESSMENT/PLAN:   Beny was seen today for physical.    Diagnoses and all orders for this visit:    Routine general medical examination at a health care facility  -     Prostate spec antigen screen    Coronary artery disease involving native heart without angina pectoris, unspecified vessel or lesion type - EKG with mild abnormalities Q- wave in III and Avf, recommended to follow up with stress test, continue medication. If symptoms suddenly onset with worsening symptoms then call 911 to be seen at the ED.  -     atorvastatin (LIPITOR) 40 MG tablet; Take 1 tablet (40 mg) by mouth daily  -     Comprehensive metabolic panel  -     EKG 12-lead complete w/read - Clinics  -     NM Exercise stress test; Future  -     OFFICE/OUTPT VISIT,EST,LEVL IV    Chest pain, unspecified type - see above, question if caused by angina.   -     CBC  "with platelets  -     OFFICE/OUTPT VISIT,EST,LEVL IV    Hyperlipidemia LDL goal <70 - Controlled, continue medication.  -     Lipid panel reflex to direct LDL Fasting  -     Comprehensive metabolic panel    Gastroesophageal reflux disease without esophagitis - Stable, continue medication.  -     omeprazole (PRILOSEC) 20 MG DR capsule; Take 1 capsule (20 mg) by mouth daily Unsure of dosage    COUNSELING:  Reviewed preventive health counseling, as reflected in patient instructions       Regular exercise       Healthy diet/nutrition    Estimated body mass index is 27.84 kg/m  as calculated from the following:    Height as of this encounter: 1.854 m (6' 1\").    Weight as of this encounter: 95.7 kg (211 lb).  Weight management plan: Discussed healthy diet and exercise guidelines   reports that he has never smoked. He has never used smokeless tobacco.    Counseling Resources:  ATP IV Guidelines  Pooled Cohorts Equation Calculator  FRAX Risk Assessment  ICSI Preventive Guidelines  Dietary Guidelines for Americans, 2010  USDA's MyPlate  ASA Prophylaxis  Lung CA Screening    The information in this document, created by the medical scribe for me, accurately reflects the services I personally performed and the decisions made by me. I have reviewed and approved this document for accuracy prior to leaving the patient care area.  8:12 AM, 07/22/19    Nhan Ramos MD  [facility] [place]  "

## 2019-07-22 ENCOUNTER — OFFICE VISIT (OUTPATIENT)
Dept: FAMILY MEDICINE | Facility: CLINIC | Age: 59
End: 2019-07-22
Payer: COMMERCIAL

## 2019-07-22 VITALS
WEIGHT: 211 LBS | OXYGEN SATURATION: 96 % | HEART RATE: 89 BPM | DIASTOLIC BLOOD PRESSURE: 80 MMHG | TEMPERATURE: 98 F | RESPIRATION RATE: 16 BRPM | SYSTOLIC BLOOD PRESSURE: 130 MMHG | BODY MASS INDEX: 27.96 KG/M2 | HEIGHT: 73 IN

## 2019-07-22 DIAGNOSIS — K21.9 GASTROESOPHAGEAL REFLUX DISEASE WITHOUT ESOPHAGITIS: ICD-10-CM

## 2019-07-22 DIAGNOSIS — I25.10 CORONARY ARTERY DISEASE INVOLVING NATIVE HEART WITHOUT ANGINA PECTORIS, UNSPECIFIED VESSEL OR LESION TYPE: ICD-10-CM

## 2019-07-22 DIAGNOSIS — E78.5 HYPERLIPIDEMIA LDL GOAL <70: ICD-10-CM

## 2019-07-22 DIAGNOSIS — Z00.00 ROUTINE GENERAL MEDICAL EXAMINATION AT A HEALTH CARE FACILITY: Primary | ICD-10-CM

## 2019-07-22 DIAGNOSIS — R07.9 CHEST PAIN, UNSPECIFIED TYPE: ICD-10-CM

## 2019-07-22 LAB
ALBUMIN SERPL-MCNC: 3.9 G/DL (ref 3.4–5)
ALP SERPL-CCNC: 75 U/L (ref 40–150)
ALT SERPL W P-5'-P-CCNC: 36 U/L (ref 0–70)
ANION GAP SERPL CALCULATED.3IONS-SCNC: 6 MMOL/L (ref 3–14)
AST SERPL W P-5'-P-CCNC: 20 U/L (ref 0–45)
BILIRUB SERPL-MCNC: 1.3 MG/DL (ref 0.2–1.3)
BUN SERPL-MCNC: 14 MG/DL (ref 7–30)
CALCIUM SERPL-MCNC: 8.6 MG/DL (ref 8.5–10.1)
CHLORIDE SERPL-SCNC: 107 MMOL/L (ref 94–109)
CHOLEST SERPL-MCNC: 158 MG/DL
CO2 SERPL-SCNC: 28 MMOL/L (ref 20–32)
CREAT SERPL-MCNC: 0.92 MG/DL (ref 0.66–1.25)
ERYTHROCYTE [DISTWIDTH] IN BLOOD BY AUTOMATED COUNT: 12.6 % (ref 10–15)
GFR SERPL CREATININE-BSD FRML MDRD: 89 ML/MIN/{1.73_M2}
GLUCOSE SERPL-MCNC: 109 MG/DL (ref 70–99)
HCT VFR BLD AUTO: 43.7 % (ref 40–53)
HDLC SERPL-MCNC: 51 MG/DL
HGB BLD-MCNC: 15.4 G/DL (ref 13.3–17.7)
LDLC SERPL CALC-MCNC: 87 MG/DL
MCH RBC QN AUTO: 31.6 PG (ref 26.5–33)
MCHC RBC AUTO-ENTMCNC: 35.2 G/DL (ref 31.5–36.5)
MCV RBC AUTO: 90 FL (ref 78–100)
NONHDLC SERPL-MCNC: 107 MG/DL
PLATELET # BLD AUTO: 242 10E9/L (ref 150–450)
POTASSIUM SERPL-SCNC: 4.1 MMOL/L (ref 3.4–5.3)
PROT SERPL-MCNC: 7.1 G/DL (ref 6.8–8.8)
PSA SERPL-ACNC: 1.45 UG/L (ref 0–4)
RBC # BLD AUTO: 4.87 10E12/L (ref 4.4–5.9)
SODIUM SERPL-SCNC: 141 MMOL/L (ref 133–144)
TRIGL SERPL-MCNC: 101 MG/DL
WBC # BLD AUTO: 7.6 10E9/L (ref 4–11)

## 2019-07-22 PROCEDURE — 80061 LIPID PANEL: CPT | Performed by: FAMILY MEDICINE

## 2019-07-22 PROCEDURE — 99214 OFFICE O/P EST MOD 30 MIN: CPT | Mod: 25 | Performed by: FAMILY MEDICINE

## 2019-07-22 PROCEDURE — 80053 COMPREHEN METABOLIC PANEL: CPT | Performed by: FAMILY MEDICINE

## 2019-07-22 PROCEDURE — 93000 ELECTROCARDIOGRAM COMPLETE: CPT | Performed by: FAMILY MEDICINE

## 2019-07-22 PROCEDURE — G0103 PSA SCREENING: HCPCS | Performed by: FAMILY MEDICINE

## 2019-07-22 PROCEDURE — 99396 PREV VISIT EST AGE 40-64: CPT | Performed by: FAMILY MEDICINE

## 2019-07-22 PROCEDURE — 85027 COMPLETE CBC AUTOMATED: CPT | Performed by: FAMILY MEDICINE

## 2019-07-22 PROCEDURE — 36415 COLL VENOUS BLD VENIPUNCTURE: CPT | Performed by: FAMILY MEDICINE

## 2019-07-22 RX ORDER — ATORVASTATIN CALCIUM 40 MG/1
40 TABLET, FILM COATED ORAL DAILY
Qty: 90 TABLET | Refills: 3 | Status: SHIPPED | OUTPATIENT
Start: 2019-07-22 | End: 2020-07-28

## 2019-07-22 ASSESSMENT — MIFFLIN-ST. JEOR: SCORE: 1820.97

## 2019-07-23 NOTE — RESULT ENCOUNTER NOTE
Dear Beny,    Here is a summary of your recent test results:  -Normal red blood cell (hgb) levels, normal white blood cell count and normal platelet levels.  -PSA (prostate specific antigen) test is normal.  This indicates a low likelihood of prostate cancer.  ADVISE: rechecking this in 1 year.  -Cholesterol levels are at your goal levels.  ADVISE: continuing your medication, a regular exercise program with at least 150 minutes of aerobic exercise per week, and eating a low saturated fat/low carbohydrate diet.  Also, you should recheck this fasting cholesterol panel in 12 months.  -Liver and gallbladder tests (ALT,AST, Alk phos,bilirubin) are normal.  -Kidney function (GFR) is normal.  -Sodium is normal.  -Potassium is normal.  -Calcium is normal.  -Glucose is slight elevated and may be a sign of early diabetes (prediabetes). ADVISE:: eating a low carbohydrate diet, exercising, trying to lose weight (if necessary) and rechecking your glucose level in 12 months.    For additional lab test information, labtestsonline.org is an excellent reference.           Thank you very much for trusting me and [facility].     Healthy regards,  Anand Ramos MD

## 2019-07-29 ENCOUNTER — HOSPITAL ENCOUNTER (OUTPATIENT)
Dept: CARDIOLOGY | Facility: CLINIC | Age: 59
Discharge: HOME | End: 2019-07-29
Attending: FAMILY MEDICINE | Admitting: FAMILY MEDICINE
Payer: COMMERCIAL

## 2019-07-29 DIAGNOSIS — I25.10 CORONARY ARTERY DISEASE INVOLVING NATIVE HEART WITHOUT ANGINA PECTORIS, UNSPECIFIED VESSEL OR LESION TYPE: ICD-10-CM

## 2019-07-29 PROCEDURE — 93016 CV STRESS TEST SUPVJ ONLY: CPT | Performed by: INTERNAL MEDICINE

## 2019-07-29 PROCEDURE — 78452 HT MUSCLE IMAGE SPECT MULT: CPT

## 2019-07-29 PROCEDURE — 93018 CV STRESS TEST I&R ONLY: CPT | Performed by: INTERNAL MEDICINE

## 2019-07-29 PROCEDURE — A9502 TC99M TETROFOSMIN: HCPCS | Performed by: FAMILY MEDICINE

## 2019-07-29 PROCEDURE — 34300033 ZZH RX 343: Performed by: FAMILY MEDICINE

## 2019-07-29 PROCEDURE — 78452 HT MUSCLE IMAGE SPECT MULT: CPT | Mod: 26 | Performed by: INTERNAL MEDICINE

## 2019-07-29 RX ADMIN — TETROFOSMIN 14.04 MCI.: 1.38 INJECTION, POWDER, LYOPHILIZED, FOR SOLUTION INTRAVENOUS at 14:38

## 2019-07-29 RX ADMIN — TETROFOSMIN 3.88 MCI.: 1.38 INJECTION, POWDER, LYOPHILIZED, FOR SOLUTION INTRAVENOUS at 13:19

## 2019-07-29 NOTE — RESULT ENCOUNTER NOTE
Dear Beny,    Here is a summary of your recent test results:  - your stress test looks good without any signs of heart disease.     Thank you very much for trusting me and [facility] [place].     Healthy regards,  Anand Ramos MD

## 2019-10-02 ENCOUNTER — HEALTH MAINTENANCE LETTER (OUTPATIENT)
Age: 59
End: 2019-10-02

## 2020-01-13 ENCOUNTER — TRANSFERRED RECORDS (OUTPATIENT)
Dept: HEALTH INFORMATION MANAGEMENT | Facility: CLINIC | Age: 60
End: 2020-01-13

## 2020-01-13 ENCOUNTER — NURSE TRIAGE (OUTPATIENT)
Dept: FAMILY MEDICINE | Facility: CLINIC | Age: 60
End: 2020-01-13

## 2020-01-13 LAB
ALT SERPL-CCNC: 23 IU/L (ref 8–45)
AST SERPL-CCNC: 18 IU/L (ref 2–40)
CREAT SERPL-MCNC: 0.84 MG/DL (ref 0.72–1.25)
GFR SERPL CREATININE-BSD FRML MDRD: >60 ML/MIN/1.73M2
GLUCOSE SERPL-MCNC: 94 MG/DL (ref 65–100)
POTASSIUM SERPL-SCNC: 3.8 MMOL/L (ref 3.5–5)

## 2020-01-13 NOTE — TELEPHONE ENCOUNTER
"Patient states that he has severe upper right abdominal pain with movement or deep breath. Last evening he laughed and it hurt so badly that he cried.   States that the last month he has had belt line pain. Yesterday pain started lower right and then moved to upper right abdomen.     GO TO ED NOW: You need to be seen in the Emergency Department. Go to the ER at [facility] (pt states [address] [facility] is closet to his work. Leave now. Drive carefully, another to drive.     Reason for Disposition    SEVERE abdominal pain (e.g., excruciating)    Additional Information    Negative: Passed out (i.e., fainted, collapsed and was not responding)    Negative: Shock suspected (e.g., cold/pale/clammy skin, too weak to stand, low BP, rapid pulse)    Negative: Visible sweat on face or sweat is dripping down    Negative: Chest pain    Negative: Pregnant > 24 weeks and hand or face swelling    Constant abdominal pain lasting > 2 hours    Answer Assessment - Initial Assessment Questions  1. LOCATION: \"Where does it hurt?\"       Upper right. Laughed and made him feel like crying  2. RADIATION: \"Does the pain shoot anywhere else?\" (e.g., chest, back)      RUQ hurts to take a deep breath  3. ONSET: \"When did the pain begin?\" (e.g., minutes, hours or days ago)       Significant pain started 24 hours ago  4. SUDDEN: \"Gradual or sudden onset?\"       Gradual. Started at belt line. Now only hurts when he moves  5. PATTERN \"Does the pain come and go, or is it constant?\"     - If constant: \"Is it getting better, staying the same, or worsening?\"       (Note: Constant means the pain never goes away completely; most serious pain is constant and it progresses)      - If intermittent: \"How long does it last?\" \"Do you have pain now?\"      (Note: Intermittent means the pain goes away completely between bouts)      Constant level of 1. Intermittently 5-8. Yesterday a 10  6. SEVERITY: \"How bad is the pain?\"  (e.g., Scale 1-10; mild, moderate, or " "severe)     - MILD (1-3): doesn't interfere with normal activities, abdomen soft and not tender to touch      - MODERATE (4-7): interferes with normal activities or awakens from sleep, tender to touch      - SEVERE (8-10): excruciating pain, doubled over, unable to do any normal activities        5-8 with deep breath or movement  7. RECURRENT SYMPTOM: \"Have you ever had this type of abdominal pain before?\" If so, ask: \"When was the last time?\" and \"What happened that time?\"       Never this intensity before  8. AGGRAVATING FACTORS: \"Does anything seem to cause this pain?\" (e.g., foods, stress, alcohol)      No alcohol. Hasn't been able to eat. Feels full.  9. CARDIAC SYMPTOMS: \"Do you have any of the following symptoms: chest pain, difficulty breathing, sweating, nausea?\"      no  10. OTHER SYMPTOMS: \"Do you have any other symptoms?\" (e.g., fever, vomiting, diarrhea)        no  11. PREGNANCY: \"Is there any chance you are pregnant?\" \"When was your last menstrual period?\"        NA    Protocols used: ABDOMINAL PAIN - UPPER-A-[place]    Maribel Grayson RN    "

## 2020-01-14 ENCOUNTER — TELEPHONE (OUTPATIENT)
Dept: FAMILY MEDICINE | Facility: CLINIC | Age: 60
End: 2020-01-14

## 2020-01-14 NOTE — TELEPHONE ENCOUNTER
Reason for call:  Patient reporting a symptom    Symptom or request: Abdominal pain     Duration (how long have symptoms been present): y      Have you been treated for this before? Yes    Additional comments: All they found was a bowel inflamation in ER & told him to take Ibprophen  . But patient is still in a lot of pain & would like a nurse to triage him.  How much Ibprophen can he take?      Phone Number patient can be reached at:  Home number on file [phone] (home)    Best Time:  any    Can we leave a detailed message on this number:  YES    Call taken on 1/14/2020 at 11:08 AM by Billie Sky

## 2020-01-14 NOTE — TELEPHONE ENCOUNTER
Pt advised to clear liquid diet moving into bland diet. Pt was seen in ER to rule out Acute need. Pt report post cholecystectomy.   Labs were WNL. Pt advised to be seen in clinic for follow up,   Next 5 appointments (look out 90 days)    Jan 20, 2020 10:00 AM CST  [facility] Visit with Nhan Ramos MD  [facility] [address] [place] [address] [id number] [phone]        Pt advised need to call back or present to ER. Patient stated an understanding and agreed with plan.    Conrad Cope RN   [facility] [place] Triage

## 2020-01-20 ENCOUNTER — OFFICE VISIT (OUTPATIENT)
Dept: FAMILY MEDICINE | Facility: CLINIC | Age: 60
End: 2020-01-20
Payer: COMMERCIAL

## 2020-01-20 VITALS
DIASTOLIC BLOOD PRESSURE: 80 MMHG | BODY MASS INDEX: 29.03 KG/M2 | SYSTOLIC BLOOD PRESSURE: 130 MMHG | TEMPERATURE: 98.5 F | HEART RATE: 81 BPM | WEIGHT: 219 LBS | HEIGHT: 73 IN | OXYGEN SATURATION: 95 %

## 2020-01-20 DIAGNOSIS — K21.9 GASTROESOPHAGEAL REFLUX DISEASE WITHOUT ESOPHAGITIS: ICD-10-CM

## 2020-01-20 DIAGNOSIS — R10.11 ABDOMINAL PAIN, RIGHT UPPER QUADRANT: Primary | ICD-10-CM

## 2020-01-20 DIAGNOSIS — Z23 ENCOUNTER FOR IMMUNIZATION: ICD-10-CM

## 2020-01-20 DIAGNOSIS — K63.89 EPIPLOIC APPENDAGITIS: ICD-10-CM

## 2020-01-20 DIAGNOSIS — R17 ELEVATED BILIRUBIN: ICD-10-CM

## 2020-01-20 PROCEDURE — 90471 IMMUNIZATION ADMIN: CPT | Performed by: FAMILY MEDICINE

## 2020-01-20 PROCEDURE — 90682 RIV4 VACC RECOMBINANT DNA IM: CPT | Performed by: FAMILY MEDICINE

## 2020-01-20 PROCEDURE — 99214 OFFICE O/P EST MOD 30 MIN: CPT | Mod: 25 | Performed by: FAMILY MEDICINE

## 2020-01-20 ASSESSMENT — MIFFLIN-ST. JEOR: SCORE: 1857.26

## 2020-01-20 NOTE — PROGRESS NOTES
"Subjective   Benyolga Jerome is a 59 year old male who presents to clinic today for the following health issues:    HPI   ED/UC Followup:    Facility:  [facility] ER  Date of visit: 01/13/2020  Reason for visit: Abdominal pain  Current Status: still has some pain- no nausea or vomiting   Pain is still present in the RUQ. He had pain across the upper and lower abdomen. Bilirubin was elevated during ED labs. No diarrhea or bloating. Decreased appetite. He is eating more since discharge. Ibuprofen and Tylenol were helpful for pain. He had pain with deep breathing. Notes from ED visit reviewed today.       Reviewed and updated as needed this visit by provider:  Tobacco  Allergies  Meds  Problems  Med Hx  Surg Hx  Fam Hx       He will get the flu shot today.     Review of Systems   Constitutional, HEENT, cardiovascular, pulmonary, GI, , musculoskeletal, neuro, skin, endocrine and psych systems are negative, except as otherwise noted.    This document serves as a record of the services and decisions personally performed and made by Nhan Ramos MD. It was created on his behalf by Davi Owen, a trained medical scribe. The creation of this document is based the provider's statements to the medical scribe.  Scribe Davi Owen 10:44 AM, January 20, 2020        Objective   /80   Pulse 81   Temp 98.5  F (36.9  C) (Oral)   Ht 1.854 m (6' 1\")   Wt 99.3 kg (219 lb)   SpO2 95%   BMI 28.89 kg/m   Body mass index is 28.89 kg/m .  Physical Exam   GENERAL: healthy, alert, well nourished, well hydrated, no distress  EYES: Eyes grossly normal to inspection, extraocular movements - intact, and PERRL  HENT: ear canals- normal; TMs- normal; Nose- normal; Mouth- no ulcers, no lesions  NECK: no tenderness, no adenopathy, no asymmetry, no masses, no stiffness; thyroid- normal to palpation  RESP: lungs clear to auscultation - no rales, no rhonchi, no wheezes  CV: regular rates and rhythm, normal S1 " "S2, no S3 or S4 and no murmur, no click or rub -  ABDOMEN: mild tenderness in the right lateral abdomen, otherwise, soft, no  hepatosplenomegaly, no masses, normal bowel sounds  MS: extremities- no gross deformities noted, no edema  BACK: no CVA tenderness, no paralumbar tenderness  NEURO: strength and tone- normal, sensory exam- grossly normal, mentation- intact, speech- normal, reflexes- symmetric  PSYCH: Alert and oriented times 3; speech- coherent , normal rate and volume; able to articulate logical thoughts, able to abstract reason, no tangential thoughts, no hallucinations or delusions, affect- normal  LYMPHATICS: ant. cervical- normal, post. cervical- normal, axillary- normal, supraclavicular- normal, inguinal- normal        Assessment & Plan   Beny was seen today for hospital f/u.    Diagnoses and all orders for this visit:    Abdominal pain, right upper quadrant - improved since ED visit with some pain in RUQ today. Future labs ordered.   -     Hepatic panel; Future    Elevated bilirubin - found in ED. Recheck in 2-4 weeks. Future labs ordered.   -     Hepatic panel; Future    Epiploic appendagitis - possible vs muscle strain due to pain with movement.     Gastroesophageal reflux disease without esophagitis - patient request H. Pylori labs. Future lab ordered today.   -     Helicobacter pylori Antigen Stool; Future    Encounter for immunization  -     VACCINE ADMINISTRATION, INITIAL  -     C RIV4 (FLUBLOK) VACCINE RECOMBINANT DNA PRSRV ANTIBIO FREE, IM      BMI:   Estimated body mass index is 27.84 kg/m  as calculated from the following:    Height as of 7/22/19: 1.854 m (6' 1\").    Weight as of 7/22/19: 95.7 kg (211 lb).   Weight management plan: Discussed healthy diet and exercise guidelines    See Patient Instructions    Return in about 1 month (around 2/20/2020) for Lab Work.    The information in this document, created by the medical scribe for me, accurately reflects the services I personally " performed and the decisions made by me. I have reviewed and approved this document for accuracy prior to leaving the patient care area.  11:04 AM, 01/20/20        Anand Ramos MD      [address] [address]  [email or web address]   Office: [phone]  Pager: [phone]

## 2020-02-02 PROCEDURE — 87338 HPYLORI STOOL AG IA: CPT | Performed by: FAMILY MEDICINE

## 2020-02-03 DIAGNOSIS — R17 ELEVATED BILIRUBIN: ICD-10-CM

## 2020-02-03 DIAGNOSIS — K21.9 GASTROESOPHAGEAL REFLUX DISEASE WITHOUT ESOPHAGITIS: ICD-10-CM

## 2020-02-03 DIAGNOSIS — R10.11 ABDOMINAL PAIN, RIGHT UPPER QUADRANT: ICD-10-CM

## 2020-02-03 LAB
ALBUMIN SERPL-MCNC: 3.7 G/DL (ref 3.4–5)
ALP SERPL-CCNC: 74 U/L (ref 40–150)
ALT SERPL W P-5'-P-CCNC: 29 U/L (ref 0–70)
AST SERPL W P-5'-P-CCNC: 15 U/L (ref 0–45)
BILIRUB DIRECT SERPL-MCNC: 0.2 MG/DL (ref 0–0.2)
BILIRUB SERPL-MCNC: 1.2 MG/DL (ref 0.2–1.3)
PROT SERPL-MCNC: 7.1 G/DL (ref 6.8–8.8)

## 2020-02-03 PROCEDURE — 36415 COLL VENOUS BLD VENIPUNCTURE: CPT | Performed by: FAMILY MEDICINE

## 2020-02-03 PROCEDURE — 80076 HEPATIC FUNCTION PANEL: CPT | Performed by: FAMILY MEDICINE

## 2020-02-04 LAB — H PYLORI AG STL QL IA: NEGATIVE

## 2020-02-06 NOTE — RESULT ENCOUNTER NOTE
Dear Beny,    Here is a summary of your recent test results:  -Liver and gallbladder tests (ALT,AST, Alk phos,bilirubin) are normal.  -No signs of abnormal stomach bacterial infection.      For additional lab test information, [email or web address] is an excellent reference.           Thank you very much for trusting me and [facility].     Healthy regards,  Anand Ramos MD

## 2020-07-28 DIAGNOSIS — I25.10 CORONARY ARTERY DISEASE INVOLVING NATIVE HEART WITHOUT ANGINA PECTORIS, UNSPECIFIED VESSEL OR LESION TYPE: ICD-10-CM

## 2020-07-28 DIAGNOSIS — K21.9 GASTROESOPHAGEAL REFLUX DISEASE WITHOUT ESOPHAGITIS: ICD-10-CM

## 2020-07-28 RX ORDER — ATORVASTATIN CALCIUM 40 MG/1
TABLET, FILM COATED ORAL
Qty: 90 TABLET | Refills: 0 | Status: SHIPPED | OUTPATIENT
Start: 2020-07-28 | End: 2020-09-15

## 2020-07-28 NOTE — TELEPHONE ENCOUNTER
A 90 day supply is given, patient is due for an office visit.  Please call to  assist the patient in scheduling an appointment.  EBONY Tucker, RN  [employer] Triage

## 2020-09-11 NOTE — PROGRESS NOTES
"Subjective   Beny SILVESTRE Jerome is a 60 year old male who presents to clinic today for the following health issues:    HPI   Hyperlipidemia Follow-Up      Are you regularly taking any medication or supplement to lower your cholesterol?   { :506848}    Are you having muscle aches or other side effects that you think could be caused by your cholesterol lowering medication?  { :534487}      How many servings of fruits and vegetables do you eat daily?  { :453947}    On average, how many sweetened beverages do you drink each day (Examples: soda, juice, sweet tea, etc.  Do NOT count diet or artificially sweetened beverages)?   { 1-11:453992}    How many days per week do you exercise enough to make your heart beat faster? { :893032}    How many minutes a day do you exercise enough to make your heart beat faster? { :426322}    How many days per week do you miss taking your medication? {0-7 :886316}      Reviewed and updated as needed this visit by provider:         Review of Systems   Constitutional, HEENT, cardiovascular, pulmonary, GI, , musculoskeletal, neuro, skin, endocrine and psych systems are negative, except as otherwise noted.    {***:place for scribe statement}      Objective   There were no vitals taken for this visit. There is no height or weight on file to calculate BMI.  Physical Exam   {.:342050::\"GENERAL: healthy, alert, well nourished, well hydrated, no distress\",\"HENT: ear canals- normal; TMs- normal; Nose- normal; Mouth- no ulcers, no lesions\",\"NECK: no tenderness, no adenopathy, no asymmetry, no masses, no stiffness; thyroid- normal to palpation\",\"RESP: lungs clear to auscultation - no rales, no rhonchi, no wheezes\",\"CV: regular rates and rhythm, normal S1 S2, no S3 or S4 and no murmur, no click or rub -\",\"ABDOMEN: soft, no tenderness, no  hepatosplenomegaly, no masses, normal bowel sounds\"}    {DIAGNOSTIC TEST RESULTS (Optional):945708::\"Diagnostic Test Results\"}      Assessment & Plan   There are no " "diagnoses linked to this encounter.       BMI:   Estimated body mass index is 28.89 kg/m  as calculated from the following:    Height as of 1/20/20: 1.854 m (6' 1\").    Weight as of 1/20/20: 99.3 kg (219 lb).   {Weight Management Plan needed for ACO:324988}        See Patient Instructions    No follow-ups on file.          Anand Ramos MD      [address] [address]  [email or web address]   Office: [phone]  Pager: [phone]         "

## 2020-09-15 ENCOUNTER — OFFICE VISIT (OUTPATIENT)
Dept: FAMILY MEDICINE | Facility: CLINIC | Age: 60
End: 2020-09-15
Payer: COMMERCIAL

## 2020-09-15 VITALS
BODY MASS INDEX: 29.16 KG/M2 | DIASTOLIC BLOOD PRESSURE: 74 MMHG | HEART RATE: 86 BPM | HEIGHT: 73 IN | SYSTOLIC BLOOD PRESSURE: 136 MMHG | TEMPERATURE: 97.2 F | WEIGHT: 220 LBS | OXYGEN SATURATION: 99 %

## 2020-09-15 DIAGNOSIS — K21.9 GASTROESOPHAGEAL REFLUX DISEASE WITHOUT ESOPHAGITIS: ICD-10-CM

## 2020-09-15 DIAGNOSIS — M72.2 PLANTAR FASCIITIS, LEFT: ICD-10-CM

## 2020-09-15 DIAGNOSIS — I25.10 CORONARY ARTERY DISEASE INVOLVING NATIVE HEART WITHOUT ANGINA PECTORIS, UNSPECIFIED VESSEL OR LESION TYPE: ICD-10-CM

## 2020-09-15 DIAGNOSIS — Z00.00 ROUTINE HISTORY AND PHYSICAL EXAMINATION OF ADULT: Primary | ICD-10-CM

## 2020-09-15 DIAGNOSIS — Z00.00 ROUTINE GENERAL MEDICAL EXAMINATION AT A HEALTH CARE FACILITY: ICD-10-CM

## 2020-09-15 DIAGNOSIS — R40.0 HAS DAYTIME DROWSINESS: ICD-10-CM

## 2020-09-15 DIAGNOSIS — R06.83 SNORING: ICD-10-CM

## 2020-09-15 DIAGNOSIS — Z12.5 SCREENING FOR PROSTATE CANCER: ICD-10-CM

## 2020-09-15 DIAGNOSIS — M79.672 PAIN OF LEFT HEEL: ICD-10-CM

## 2020-09-15 PROCEDURE — 99396 PREV VISIT EST AGE 40-64: CPT | Performed by: FAMILY MEDICINE

## 2020-09-15 RX ORDER — ATORVASTATIN CALCIUM 40 MG/1
40 TABLET, FILM COATED ORAL DAILY
Qty: 90 TABLET | Refills: 3 | Status: SHIPPED | OUTPATIENT
Start: 2020-09-15 | End: 2021-11-26

## 2020-09-15 ASSESSMENT — MIFFLIN-ST. JEOR: SCORE: 1856.79

## 2020-09-15 NOTE — PROGRESS NOTES
SUBJECTIVE:   CC: Beny Jerome is an 60 year old male who presents for preventative health visit.     Patient has been advised of split billing requirements and indicates understanding: Yes  HPI      Hyperlipidemia Follow-Up      Are you regularly taking any medication or supplement to lower your cholesterol?   Yes- Lipitor    Are you having muscle aches or other side effects that you think could be caused by your cholesterol lowering medication?  No    His wife wakes him beacuase the stops breathing at times.  Some associated headaches.  Some daytime drowsiness.    Some back pains     Left heel pain for the last 2 weeks. Tried a plantar fasciitis boot - different insoles -  Some days are better than others    Today's PHQ-2 Score:   PHQ-2 ( 1999 Pf[employer]) 9/15/2020   Q1: Little interest or pleasure in doing things 0   Q2: Feeling down, depressed or hopeless 0   PHQ-2 Score 0       Abuse: Current or Past(Physical, Sexual or Emotional)- No  Do you feel safe in your environment? Yes    Social History     Tobacco Use     Smoking status: Never Smoker     Smokeless tobacco: Never Used   Substance Use Topics     Alcohol use: No     Comment: quit 1990     If you drink alcohol do you typically have >3 drinks per day or >7 drinks per week? No    Alcohol Use 9/15/2020   Prescreen: >3 drinks/day or >7 drinks/week? No       Last PSA:   PSA   Date Value Ref Range Status   07/22/2019 1.45 0 - 4 ug/L Final     Comment:     Assay Method:  Chemiluminescence using Siemens Vista analyzer       Reviewed orders with patient. Reviewed health maintenance and updated orders accordingly - Yes      Reviewed and updated as needed this visit by clinical staff  Tobacco  Allergies  Meds  Problems  Med Hx  Surg Hx  Fam Hx  Soc Hx          Reviewed and updated as needed this visit by Provider  Tobacco  Allergies  Meds  Problems  Med Hx  Surg Hx  Fam Hx          ROS:  Constitutional, HEENT, cardiovascular, pulmonary, GI, ,  "musculoskeletal, neuro, skin, endocrine and psych systems are negative, except as otherwise noted.    OBJECTIVE:   /74   Pulse 86   Temp 97.2  F (36.2  C) (Tympanic)   Ht 1.854 m (6' 1\")   Wt 99.8 kg (220 lb)   SpO2 99%   BMI 29.03 kg/m    EXAM:  GENERAL: healthy, alert and no distress  EYES: Eyes grossly normal to inspection, PERRL and conjunctivae and sclerae normal  HENT: ear canals and TM's normal, nose and mouth without ulcers or lesions  NECK: no adenopathy, no asymmetry, masses, or scars and thyroid normal to palpation  RESP: lungs clear to auscultation - no rales, rhonchi or wheezes  BREAST: normal without masses, tenderness or nipple discharge and no palpable axillary masses or adenopathy  CV: regular rate and rhythm, normal S1 S2, no S3 or S4, no murmur, click or rub, no peripheral edema and peripheral pulses strong  ABDOMEN: soft, nontender, no hepatosplenomegaly, no masses and bowel sounds normal   (male): normal male genitalia without lesions or urethral discharge, no hernia  MS: no gross musculoskeletal defects noted, no edema  SKIN: no suspicious lesions or rashes  NEURO: Normal strength and tone, mentation intact and speech normal  PSYCH: mentation appears normal, affect normal/bright  LYMPH: no cervical, supraclavicular, axillary, or inguinal adenopathy  RECTAL: normal sphincter tone, no rectal masses, prostate normal size, smooth, nontender without nodules or masses    ASSESSMENT/PLAN:   Routine history and physical examination of adult    Coronary artery disease involving native heart without angina pectoris, unspecified vessel or lesion type  Stable - continue medication(s)   - atorvastatin (LIPITOR) 40 MG tablet  Dispense: 90 tablet; Refill: 3  - Lipid panel reflex to direct LDL Fasting  - Comprehensive metabolic panel (BMP + Alb, Alk Phos, ALT, AST, Total. Bili, TP)    Gastroesophageal reflux disease without esophagitis  Controlled - continue medication.   - omeprazole (PRILOSEC) " "20 MG DR capsule  Dispense: 90 capsule; Refill: 3  - CBC with platelets    Screening for prostate cancer  - Prostate spec antigen screen    Pain of left heel  Plantar fasciitis, left  Stretching exercises, orthotics may be helpful and ice.    Snoring  Has daytime drowsiness  Symptoms suggestive of sleep apnea, will get sleep study  - SLEEP EVALUATION & MANAGEMENT REFERRAL - ADULT -[facility]  [phone] (Age 18 and up)      COUNSELING:  Reviewed preventive health counseling, as reflected in patient instructions    BP Readings from Last 1 Encounters:   09/15/20 136/74     Estimated body mass index is 29.03 kg/m  as calculated from the following:    Height as of this encounter: 1.854 m (6' 1\").    Weight as of this encounter: 99.8 kg (220 lb).      Weight management plan: Discussed healthy diet and exercise guidelines     reports that he has never smoked. He has never used smokeless tobacco.      Return in about 1 month (around 10/15/2020) for recheck and wellness check in 1 year..         Anand Ramos MD     [address] [address]  [email or web address]   Office: [phone]  Pager: [phone]       "

## 2020-09-15 NOTE — PATIENT INSTRUCTIONS
Preventive Health Recommendations  Male Ages 50 - 64    Yearly exam:             See your health care provider every year in order to  o   Review health changes.   o   Discuss preventive care.    o   Review your medicines if your doctor has prescribed any.     Have a cholesterol test every 5 years, or more frequently if you are at risk for high cholesterol/heart disease.     Have a diabetes test (fasting glucose) every three years. If you are at risk for diabetes, you should have this test more often.     Have a colonoscopy at age 50, or have a yearly FIT test (stool test). These exams will check for colon cancer.      Talk with your health care provider about whether or not a prostate cancer screening test (PSA) is right for you.    You should be tested each year for STDs (sexually transmitted diseases), if you re at risk.     Shots: Get a flu shot each year. Get a tetanus shot every 10 years.     Nutrition:    Eat at least 5 servings of fruits and vegetables daily.     Eat whole-grain bread, whole-wheat pasta and brown rice instead of white grains and rice.     Get adequate Calcium and Vitamin D.     Lifestyle    Exercise for at least 150 minutes a week (30 minutes a day, 5 days a week). This will help you control your weight and prevent disease.     Limit alcohol to one drink per day.     No smoking.     Wear sunscreen to prevent skin cancer.     See your dentist every six months for an exam and cleaning.     See your eye doctor every 1 to 2 years.  Search:    [employer] - plantar fasciitis exercises

## 2020-09-28 VITALS — HEIGHT: 74 IN | BODY MASS INDEX: 27.59 KG/M2 | WEIGHT: 215 LBS

## 2020-09-28 ASSESSMENT — MIFFLIN-ST. JEOR: SCORE: 1842.04

## 2020-09-28 NOTE — PATIENT INSTRUCTIONS
"1. We will schedule you for a home sleep study to determine if you have sleep apnea.  2. Follow-up with Dr. Tinoco after your home sleep test to discuss results of your home sleep test and the next steps.    1. What is Obstructive Sleep Apnea (SVETLANA)? SVETLANA is the most common type of sleep apnea. Apnea means \"without breath.\"  Apnea is most often caused by narrowing or collapse of the upper airway as muscles relax during sleep. Almost everyone has occasional apneas. Most people with sleep apnea have had brief interruptions at night frequently for many years.  The severity of sleep apnea is related to how frequent and severe the events are.    2. What are the consequences of SVETLANA? Symptoms include: feeling sleepy during the day, snoring loudly, gasping or stopping of breathing, trouble sleeping, and occasionally morning headaches or heartburn at night.  Sleepiness can be serious and even increase the risk of falling asleep while driving. Other health consequences may include development of high blood pressure and other cardiovascular disease in persons who are susceptible. Untreated SVETLANA  can contribute to heart disease, stroke and diabetes.    3. What are the treatment options? In most situations, sleep apnea is a lifelong disease that must be managed with daily therapy. Medications are not effective for sleep apnea and surgery is generally not considered until other therapies have been tried. Your treatment is your choice . Continuous Positive Airway (CPAP) works right away and is the therapy that is effective in nearly everyone. An oral device to hold your jaw forward is usually the next most reliable option. Other options include postioning devices (to keep you off your back), weight loss, and surgery including a tongue pacing device. There is more detail about some of these options below.    Important tips for using CPAP and similar devices   Know your equipment:  CPAP is continuous positive airway pressure that " prevents obstructive sleep apnea by keeping the throat from collapsing while you are sleeping. In most cases, the device is  smart  and can slowly self-adjusts if your throat collapses and keeps a record every day of how well you are treated-this information is available to you and your care team.    BPAP is bilevel positive airway pressure that keeps your throat open and also assists each breath with a pressure boost to maintain adequate breathing.  Special kinds of BPAP are used in patients who have inadequate breathing from lung or heart disease. In most cases, the device is  smart  and can slowly self-adjusts to assist breathing. Like CPAP, the device keeps a record of how well you are treated.    Your mask is your connection to the device. You get to choose what feels most comfortable and the staff will help to make sure if fits. Here: are some examples of the different masks that are available:         Key points to remember on your journey with sleep apnea:  1. Sleep study.  PAP devices often need to be adjusted during a sleep study to show that they are effective and adjusted right.  2. Good tips to remember: Try wearing just the mask during a quiet time during the day so your body adapts to wearing it. A humidifier is recommended for comfort in most cases to prevent drying of your nose and throat. Allergy medication from your provider may help you if you are having nasal congestion.  3. Getting settled-in. It takes more than one night for most of us to get used to wearing a mask. Try wearing just the mask during a quiet time during the day so your body adapts to wearing it. A humidifier is recommended for comfort in most cases. Our team will work with you carefully on the first day and will be in contact within 4 days and again at 2 and 4 weeks for advice and remote device adjustments. Your therapy is evaluated by the device each day.   4. Use it every night. The more you are able to sleep naturally for 7-8  hours, the more likely you will have good sleep and to prevent health risks or symptoms from sleep apnea. Even if you use it 4 hours it helps. Occasionally all of us are unable to use a medical therapy, in sleep apnea, it is not dangerous to miss one night.   5. Communicate. Call our skilled team on the number provided on the first day if your visit for problems that make it difficult to wear the device. Over 2 out of 3 patients can learn to wear the device long-term with help from our team. Remember to call our team or your sleep providers if you are unable to wear the device as we may have other solutions for those who cannot adapt to mask CPAP therapy. It is recommended that you sleep your sleep provider within the first 3 months and yearly after that if you are not having problems.   6. Use it for your health. We encourage use of CPAP masks during daytime quiet periods to allow your face and brain to adapt to the sensation of CPAP so that it will be a more natural sensation to awaken to at night or during naps. This can be very useful during the first few weeks or months of adapting to CPAP though it does not help medically to wear CPAP during wakefulness and  should not be used as a strategy just to meet guidelines.  7. Take care of your equipment. Make sure you clean your mask and tubing using directions every day and that your filter and mask are replaced as recommended or if they are not working.     BESIDES CPAP, WHAT OTHER THERAPIES ARE THERE?  Oral Appliance  What is oral appliance therapy?  An oral appliance device fits on your teeth at night like a retainer used after having braces. The device is made by a specialized dentist and requires several visits over 1-2 months before a manufactured device is made to fit your teeth and is adjusted to prevent your sleep apnea. Once an oral device is working properly, snoring should be improved. A home sleep test may be recommended at that time if to determine  whether the sleep apnea is adequately treated.        Some things to remember:  -Oral devices are often, but not always, covered by your medical insurance. Be sure to check with your insurance provider.   -If you are referred for oral therapy, you will be given a list of specialized dentists to consider or you may choose to visit the Web site of the American Academy of Dental Sleep Medicine  -Oral devices are less likely to work if you have severe sleep apnea or are extremely overweight.     Positioning Device  Positioning devices are generally used when sleep apnea is mild and only occurs on your back.This example shows a pillow that straps around the waist. It may be appropriate for those whose sleep study shows milder sleep apnea that occurs primarily when lying flat on one's back. Preliminary studies have shown benefit but effectiveness at home may need to be verified by a home sleep test. These devices are generally not covered by medical insurance.    Devices that maintain sleeping on the back to prevent snoring and mild sleep apnea:    Belt type body positioner:  http://Drybar.com/    Electronic reminder:  Http://nightshifttherapy.com/  Http://www.nPario.Bjond.au/  More detailed information  An oral appliance is a small acrylic device that fits over the upper and lower teeth  (similar to a retainer or a mouth guard). This device slightly moves jaw forward, which moves the base of the tongue forward, opens the airway, improves breathing for effective treat snoring and obstructive sleep apnea in perhaps 7 out of 10 people .  The best working devices are custom-made by a dental device  after a mold is made of the teeth 1, 2, 3.  When is an oral appliance indicated?  Oral appliance therapy is recommended as a first-line treatment for patients with primary snoring, mild sleep apnea, and for patients with moderate sleep apnea who prefer appliance therapy to use of CPAP4, 5. Severity of sleep apnea is  determined by sleep testing and is based on the number of respiratory events per hour of sleep.   How successful is oral appliance therapy?  The success rate of oral appliance therapy in patients with mild sleep apnea is 75-80% while in patients with moderate sleep apnea it is 50-70%. The chance of success in patients with severe sleep apnea is 40-50%. The research also shows that oral appliances have a beneficial effect on the cardiovascular health of SVETLANA patients at the same magnitude as CPAP therapy7.  Oral appliances should be a second-line treatment in cases of severe sleep apnea, but if not completely successful then a combination therapy utilizing CPAP plus oral appliance therapy may be effective. Oral appliances tend to be effective in a broad range of patients although studies show that the patients who have the highest success are females, younger patients, those with milder disease, and less severe obesity. 3, 6.   Finding a dentist that practices dental sleep medicine  Specific training is available through the American Academy of Dental Sleep Medicine for dentists interested in working in the field of sleep. To find a dentist who is educated in the field of sleep and the use of oral appliances, near you, visit the Web site of the American Academy of Dental Sleep Medicine.  References  1. Carlton, et al. Objectively measured vs self-reported compliance during oral appliance therapy for sleep-disordered breathing. Chest 2013; 144(5): 4205-7422.  2. Corrina et al. Objective measurement of compliance during oral appliance therapy for sleep-disordered breathing. Thorax 2013; 68(1): 91-96.  3. Kassandra, et al. Mandibular advancement devices in 620 men and women with SVETLANA and snoring: tolerability and predictors of treatment success. Chest 2004; 125: 0156-8917.  4. Lisa et al. Oral appliances for snoring and SVETLANA: a review. Sleep 2006; 29: 244-262.  5. Marcy et al. Oral appliance treatment for  "SVETLANA: an update. J Clin Sleep Med 2014; 10(2): 215-227.  6. Nighat et al. Predictors of OSAH treatment outcome. J Dent Res 2007; 86: 8216-9866.    Weight Loss:  Weight loss is a long-term strategy that may improve sleep apnea in some patients.    Weight management is a personal decision and the decision should be based on your interest and the potential benefits.  If you are interested in exploring weight loss strategies, the following discussion covers the impact on weight loss on sleep apnea and the approaches that may be successful.    Being overweight does not necessarily mean you will have health consequences.  Those who have BMI over 35 or over 27 with existing medical conditions carries greater risk.   Weight loss decreases severity of sleep apnea in most people with obesity. For those with mild obesity who have developed snoring with weight gain, even 15-30 pound weight loss can improve and occasionally eliminate sleep apnea.  Structured and life-long dietary and health habits are necessary to lose weight and keep healthier weight levels.     Though there may be significant health benefits from weight loss, long-term weight loss is very difficult to achieve- studies show success with dietary management in less than 10% of people. In addition, substantial weight loss may require years of dietary control and may be difficult if patients have severe obesity. In these cases, surgical management may be considered.  Finally, older individuals who have tolerated obesity without health complications may be less likely to benefit from weight loss strategies.    SLEEP HYGIENE  - do not use electronic devices with bright screens (phones, TVs, computers) within 1 hour of bedtime; if possible, use a \"night mode\" on your devices  - do not exercise within 6 hours before bedtime  - create a good sleep environment: do not eat or watch tv in bed, make your bedroom as dark and quiet as possible  - keep bed partners consistent " and as few as possible; try to avoid children and pets sleeping in the bed with you  - do not to eat a large meal before bed; a light snack is ok  - no caffeine within 6 hours of bedtime (coffee, tea, soda, energy drinks, chocolate, some pain relievers)  - no more than 3 caffeinated beverages per day  - no alcohol within 4-6 hours of bedtime  - no nicotine before bedtime    Resources:  https://aasm.org/resources/pdf/products/valerioeptramaineenadebra_web.pdf    Drive Safe... Drive Alive     Sleep health profoundly affects your health, mood, and your safety. 33% of the population (one in three of us) is not getting enough sleep and many have a sleep disorder. Not getting enough sleep or having an untreated / undertreated sleep condition may make us sleepy without even knowing it. In fact, our driving could be dramatically impaired due to our sleep health. As your provider, here are some things I would like you to know about driving:     Here are some warning signs for impairment and dangerous drowsy driving:              -Having been awake more than 16 hours              -Looking tired              -Eyelid drooping              -Head nodding (it could be too late at this point)              -Driving for more than 30 minutes     Some things you could do to make the driving safer if you are experiencing some drowsiness:              -Stop driving and rest              -Call for transportation              -Make sure your sleep disorder is adequately treated    Some things that have been shown NOT to work when experiencing drowsiness while driving:              -Turning on the radio              -Opening windows              -Eating any  distracting  /  entertaining  foods (e.g., sunflower seeds, candy, or any other)              -Talking on the phone    Your decision may not only impact your life, but also the life of others. Please, remember to drive safe for yourself and all of us.    Your BMI is Body mass index is 27.98  kg/m .  Weight management is a personal decision.  If you are interested in exploring weight loss strategies, the following discussion covers the approaches that may be successful. Body mass index (BMI) is one way to tell whether you are at a healthy weight, overweight, or obese. It measures your weight in relation to your height.  A BMI of 18.5 to 24.9 is in the healthy range. A person with a BMI of 25 to 29.9 is considered overweight, and someone with a BMI of 30 or greater is considered obese. More than two-thirds of American adults are considered overweight or obese.  Being overweight or obese increases the risk for further weight gain. Excess weight may lead to heart disease and diabetes.  Creating and following plans for healthy eating and physical activity may help you improve your health.  Weight control is part of healthy lifestyle and includes exercise, emotional health, and healthy eating habits. Careful eating habits lifelong are the mainstay of weight control. Though there are significant health benefits from weight loss, long-term weight loss with diet alone may be very difficult to achieve- studies show long-term success with dietary management in less than 10% of people. Attaining a healthy weight may be especially difficult to achieve in those with severe obesity. In some cases, medications, devices and surgical management might be considered.  What can you do?  If you are overweight or obese and are interested in methods for weight loss, you should discuss this with your provider.     Consider reducing daily calorie intake by 500 calories.     Keep a food journal.     Avoiding skipping meals, consider cutting portions instead.    Diet combined with exercise helps maintain muscle while optimizing fat loss. Strength training is particularly important for building and maintaining muscle mass. Exercise helps reduce stress, increase energy, and improves fitness. Increasing exercise without diet control,  however, may not burn enough calories to loose weight.       Start walking three days a week 10-20 minutes at a time    Work towards walking thirty minutes five days a week     Eventually, increase the speed of your walking for 1-2 minutes at time    In addition, we recommend that you review healthy lifestyles and methods for weight loss available through the National Institutes of Health patient information sites:  http://win.niddk.nih.gov/publications/index.htm    And look into health and wellness programs that may be available through your health insurance provider, employer, local community center, or carina club.    Weight management plan: Patient was referred to their PCP to discuss a diet and exercise plan.

## 2020-09-28 NOTE — PROGRESS NOTES
"Beny Jerome is a 60 year old male who is being evaluated via a billable telephone visit.      The patient has been notified of following:     \"This telephone visit will be conducted via a call between you and your physician/provider. We have found that certain health care needs can be provided without the need for a physical exam.  This service lets us provide the care you need with a short phone conversation.  If a prescription is necessary we can send it directly to your pharmacy.  If lab work is needed we can place an order for that and you can then stop by our lab to have the test done at a later time.    Telephone visits are billed at different rates depending on your insurance coverage. During this emergency period, for some insurers they may be billed the same as an in-person visit.  Please reach out to your insurance provider with any questions.    If during the course of the call the physician/provider feels a telephone visit is not appropriate, you will not be charged for this service.\"    Patient has given verbal consent for Telephone visit?  Yes    What phone number would you like to be contacted at? [phone]    [facility]    Phone call duration: 27 minutes    Xavier Tinoco MD    "

## 2020-09-29 ENCOUNTER — VIRTUAL VISIT (OUTPATIENT)
Dept: SLEEP MEDICINE | Facility: CLINIC | Age: 60
End: 2020-09-29
Payer: COMMERCIAL

## 2020-09-29 DIAGNOSIS — R06.83 SNORING: ICD-10-CM

## 2020-09-29 DIAGNOSIS — G47.19 EXCESSIVE DAYTIME SLEEPINESS: ICD-10-CM

## 2020-09-29 PROCEDURE — 99202 OFFICE O/P NEW SF 15 MIN: CPT | Mod: TEL | Performed by: STUDENT IN AN ORGANIZED HEALTH CARE EDUCATION/TRAINING PROGRAM

## 2020-09-29 NOTE — PROGRESS NOTES
"[facility]  Referral for: snoring, daytime drowsiness  Patient Name: Beny Jerome  MRN: [id number]  : 1960  Date of Clinic Visit: 2020  Primary Care Provider: Nhan Ramos  Referring Provider: Nhan Ramos    Beny Jerome is sent by Nhan Ramos for a sleep consultation regarding evaluation for possible sleep apnea     CHIEF COMPLAINT: \"My wife tells me often that I stop breathing when I'm sleeping.\"    HISTORY OF PRESENT ILLNESS:  Beny Jerome is a 60 year old male w/ PMH of CAD and HLD presenting for evaluation of snoring and daytime sleepiness.    Sleep scores  ESS: 12  DIANNE: 13  STOP-BAN (snoring, apneas, age, gender)    Sleep timing  Bedtime: 2100 weekdays, 2340-2194 weekends  Sleep onset: usually within 15 minutes, rarely more  Nocturnal awakenings: 2-3 times/night typically, usually nocturia, has difficulty falling back asleep especially when he wakes in the early mornings  Wake time: 0545 , ~0700 weekends  Sleep inertia: rarely  Naps: \"on occasion\" maybe once/week    Sleep-related breathing  Snoring/Apneas: snores sometimes, witnessed apneas by his wife  Choking/Gasping: sometimes wakes up with a snort rarely  Morning HAs: only on days when his breathing is severe enough to bother his wife  Dry mouth: sometimes    Sleep-related behaviors  Restless legs: No, and isn't a very active sleeper  Dream enactment: No  Sleep walking: No  Nightmares/Night terrors: dreams from time to time  Sleep paralysis: No  Confusional arousals: No    Sleep hygiene  Sleep partners: Wife  Caffeine: 2-3 cups/coffee in the AM, 1 soda at lunch, rarely in the PM  Sleep-aids: no  Screen use: sometimes watches \"mindless shows\"    ASSESSMENT AND PLAN:  Beny is a 60M with significant PMH of CAD presenting for evaluation of snoring and excessive daytime sleepiness with concern for SVETLANA.    # Snoring  # Excessive daytime sleepiness: ESS 12   - counseled on risks of drowsy driving, " improving sleep hygiene  # Suspected SVETLANA: intermediate -risk based on STOP-BANG. We will obtain home study, and we discussed next-steps if his home test is negative and he understood the plan.   - HST   - follow-up after HST    The plan was formulated with Dr. Sadler.    Xavier Tinoco MD  Sleep Medicine Fellow  [facility]      Attending: As the attending physician I was present with  Dr. Xavier Tinoco,  during this clinic visit. I personally reviewed the pickett aspects of the history, exam and I agree with the above documentation.Plan is to obtain home sleep study and if this test is negative for SVETLANA will consider obtaining supervised polysomnography.  Information regarding treatment options for SVETLANA were provided to patient as after visit summary.    Vignesh Pascual MD   of Medicine,  Division of Pulmonary/Sleep Medicine  [facility].  [facility]   Floor 1, [address]   [address] [place] [address]   Appointments: [phone]        PHYSICAL EXAMINATION:  Vitals: no vitals taken for this telephone visit  Body mass index is 27.98 kg/m .  GENERAL APPEARANCE:in no distress  PSYCH: Alert and oriented times 3; coherent speech, normal   rate and volume, able to articulate logical thoughts, able   to abstract reason, no tangential thoughts, no hallucinations   or delusions  His affect is normal  RESP: No cough, no audible wheezing, able to talk in full sentences  Remainder of exam unable to be completed due to telephone visit        INVESTIGATIONS:  Prior PSG: None    CO2 = 27-28    ECHO: 2011 stress ECHO wnl    REVIEW OF SYSTEMS: 12-point RoS negative except as per HPI.  -------------------------------------------------------------------------------------------------------------------------------  No Known Allergies  Current Outpatient Medications    Medication Sig Dispense Refill     ASPIRIN ADULT LOW STRENGTH PO Take 81 mg by mouth daily        aspirin-acetaminophen-caffeine (EXCEDRIN MIGRAINE) 250-250-65 MG per tablet Take 1 tablet by mouth every 6 hours as needed for headaches 80 tablet      atorvastatin (LIPITOR) 40 MG tablet Take 1 tablet (40 mg) by mouth daily 90 tablet 3     omeprazole (PRILOSEC) 20 MG DR capsule Take 1 capsule (20 mg) by mouth daily 90 capsule 3     Past Medical History:   Diagnosis Date     Abnormal stress test      CAD (coronary artery disease)     [facility] - Dr Hummel     Chest pain      GERD (gastroesophageal reflux disease)      Hyperlipidemia LDL goal <100      Past Surgical History:   Procedure Laterality Date     APPENDECTOMY  1971     CHOLECYSTECTOMY, LAPOROSCOPIC  2006    Cholecystectomy, Laparoscopic     COLONOSCOPY N/A 4/16/2015    Procedure: COLONOSCOPY;  Surgeon: Paco Shaver MD;  Location: [facility]     disc herniation repair cervical  2004     ESOPHAGOSCOPY, GASTROSCOPY, DUODENOSCOPY (EGD), COMBINED N/A 4/16/2015    Procedure: COMBINED ESOPHAGOSCOPY, GASTROSCOPY, DUODENOSCOPY (EGD), BIOPSY SINGLE OR MULTIPLE;  Surgeon: Paco Shaver MD;  Location: [facility]     lipoma      x 3     LITHOTRIPSY  1977    scope removal     Family History   Problem Relation Age of Onset     Coronary Artery Disease Father 60        multiple MIs     Leukemia Father      Hyperlipidemia Father      Alzheimer Disease Mother      No Known Problems Brother      Diabetes No family hx of      Hypertension No family hx of      Cerebrovascular Disease No family hx of      Breast Cancer No family hx of      Prostate Cancer No family hx of      Colon Cancer No family hx of      Social History     Socioeconomic History     Marital status:      Spouse name: Bisi     Number of children: 5     Years of education: Not on file     Highest education level: Not on file   Occupational History     Employer: [employer]   Social Needs      Financial resource strain: Not on file     Food insecurity     Worry: Not on file     Inability: Not on file     Transportation needs     Medical: Not on file     Non-medical: Not on file   Tobacco Use     Smoking status: Never Smoker     Smokeless tobacco: Never Used   Substance and Sexual Activity     Alcohol use: No     Comment: quit 1990     Drug use: No     Sexual activity: Yes     Partners: Female   Lifestyle     Physical activity     Days per week: Not on file     Minutes per session: Not on file     Stress: Not on file   Relationships     Social connections     Talks on phone: Not on file     Gets together: Not on file     Attends Congregation service: Not on file     Active member of club or organization: Not on file     Attends meetings of clubs or organizations: Not on file     Relationship status: Not on file     Intimate partner violence     Fear of current or ex partner: Not on file     Emotionally abused: Not on file     Physically abused: Not on file     Forced sexual activity: Not on file   Other Topics Concern     Parent/sibling w/ CABG, MI or angioplasty before 65F 55M? No     Comment: pt unsure of fathers age       Service Not Asked     Blood Transfusions Not Asked     Caffeine Concern Yes     Comment: 4 cups daily      Occupational Exposure Not Asked     Hobby Hazards Not Asked     Sleep Concern Not Asked     Stress Concern Not Asked     Weight Concern No     Special Diet No     Back Care Not Asked     Exercise Yes     Comment: 10,0000 steps per day     Bike Helmet Not Asked     Seat Belt Yes     Self-Exams Not Asked   Social History Narrative    -[employer] -  - .        This note was written with the assistance of the Dragon voice-dictation technology software. The final document, although reviewed, may contain errors. For corrections, please contact the office.

## 2020-10-12 ENCOUNTER — VIRTUAL VISIT (OUTPATIENT)
Dept: FAMILY MEDICINE | Facility: OTHER | Age: 60
End: 2020-10-12
Payer: COMMERCIAL

## 2020-10-12 DIAGNOSIS — I25.10 CORONARY ARTERY DISEASE INVOLVING NATIVE HEART WITHOUT ANGINA PECTORIS, UNSPECIFIED VESSEL OR LESION TYPE: ICD-10-CM

## 2020-10-12 DIAGNOSIS — Z12.5 SCREENING FOR PROSTATE CANCER: ICD-10-CM

## 2020-10-12 DIAGNOSIS — K21.9 GASTROESOPHAGEAL REFLUX DISEASE WITHOUT ESOPHAGITIS: ICD-10-CM

## 2020-10-12 LAB
ALBUMIN SERPL-MCNC: 3.6 G/DL (ref 3.4–5)
ALP SERPL-CCNC: 74 U/L (ref 40–150)
ALT SERPL W P-5'-P-CCNC: 39 U/L (ref 0–70)
ANION GAP SERPL CALCULATED.3IONS-SCNC: 4 MMOL/L (ref 3–14)
AST SERPL W P-5'-P-CCNC: 18 U/L (ref 0–45)
BILIRUB SERPL-MCNC: 1.2 MG/DL (ref 0.2–1.3)
BUN SERPL-MCNC: 11 MG/DL (ref 7–30)
CALCIUM SERPL-MCNC: 8.6 MG/DL (ref 8.5–10.1)
CHLORIDE SERPL-SCNC: 108 MMOL/L (ref 94–109)
CHOLEST SERPL-MCNC: 161 MG/DL
CO2 SERPL-SCNC: 27 MMOL/L (ref 20–32)
CREAT SERPL-MCNC: 0.98 MG/DL (ref 0.66–1.25)
ERYTHROCYTE [DISTWIDTH] IN BLOOD BY AUTOMATED COUNT: 12.7 % (ref 10–15)
GFR SERPL CREATININE-BSD FRML MDRD: 83 ML/MIN/{1.73_M2}
GLUCOSE SERPL-MCNC: 99 MG/DL (ref 70–99)
HCT VFR BLD AUTO: 45 % (ref 40–53)
HDLC SERPL-MCNC: 51 MG/DL
HGB BLD-MCNC: 15.2 G/DL (ref 13.3–17.7)
LDLC SERPL CALC-MCNC: 87 MG/DL
MCH RBC QN AUTO: 30.8 PG (ref 26.5–33)
MCHC RBC AUTO-ENTMCNC: 33.8 G/DL (ref 31.5–36.5)
MCV RBC AUTO: 91 FL (ref 78–100)
NONHDLC SERPL-MCNC: 110 MG/DL
PLATELET # BLD AUTO: 232 10E9/L (ref 150–450)
POTASSIUM SERPL-SCNC: 4 MMOL/L (ref 3.4–5.3)
PROT SERPL-MCNC: 7.5 G/DL (ref 6.8–8.8)
PSA SERPL-ACNC: 0.89 UG/L (ref 0–4)
RBC # BLD AUTO: 4.93 10E12/L (ref 4.4–5.9)
SODIUM SERPL-SCNC: 139 MMOL/L (ref 133–144)
TRIGL SERPL-MCNC: 116 MG/DL
WBC # BLD AUTO: 8.8 10E9/L (ref 4–11)

## 2020-10-12 PROCEDURE — 36415 COLL VENOUS BLD VENIPUNCTURE: CPT | Performed by: FAMILY MEDICINE

## 2020-10-12 PROCEDURE — 99421 OL DIG E/M SVC 5-10 MIN: CPT | Performed by: PHYSICIAN ASSISTANT

## 2020-10-12 PROCEDURE — 80053 COMPREHEN METABOLIC PANEL: CPT | Performed by: FAMILY MEDICINE

## 2020-10-12 PROCEDURE — 80061 LIPID PANEL: CPT | Performed by: FAMILY MEDICINE

## 2020-10-12 PROCEDURE — G0103 PSA SCREENING: HCPCS | Performed by: FAMILY MEDICINE

## 2020-10-12 PROCEDURE — 85027 COMPLETE CBC AUTOMATED: CPT | Performed by: FAMILY MEDICINE

## 2020-10-12 NOTE — PROGRESS NOTES
"Date: 10/12/2020 10:33:55  Clinician: Nhan Erickson  Clinician NPI: [id number]  Patient: Beny Jerome  Patient : 1960  Patient Address: [address] [place] [address]  Patient Phone: [phone]  Visit Protocol: URI  Patient Summary:  Beny is a 60 year old ( : 1960 ) male who initiated a [facility] Visit for COVID-19 (Coronavirus) evaluation and screening. When asked the question \"Please sign me up to receive news, health information and promotions from [facility].\", Beny responded \"No\".    Beny states his symptoms started gradually 3-4 days ago. After his symptoms started, they improved and then got worse again.   His symptoms consist of a cough, nasal congestion, rhinitis, myalgia, malaise, and a sore throat. He is experiencing difficulty breathing due to nasal congestion but he is not short of breath.   Symptom details     Nasal secretions: The color of his mucus is clear.    Cough: Beny coughs every 5-10 minutes and his cough is more bothersome at night. Phlegm comes into his throat when he coughs. He believes his cough is caused by post-nasal drip. The color of the phlegm is white.     Sore throat: Beny reports having mild throat pain (1-3 on a 10 point pain scale), does not have exudate on his tonsils, and can swallow liquids. The lymph nodes in his neck are not enlarged. A rash has not appeared on the skin since the sore throat started.      Beny denies having ear pain, headache, wheezing, fever, enlarged lymph nodes, anosmia, vomiting, nausea, facial pain or pressure, chills, teeth pain, ageusia, and diarrhea. He also denies taking antibiotic medication in the past month and having recent facial or sinus surgery in the past 60 days.   Precipitating events  Beny is not sure if he has been exposed to someone with strep throat. He has not recently been exposed to someone with influenza. Beny has been in close contact with the following high risk individuals: people with asthma, heart " disease or diabetes.   Pertinent COVID-19 (Coronavirus) information  In the past 14 days, Beny has not worked in a congregate living setting.   He does not work or volunteer as healthcare worker or a  and does not work or volunteer in a healthcare facility.   Beny also has not lived in a congregate living setting in the past 14 days. He does not live with a healthcare worker.   Beny has not had a close contact with a laboratory-confirmed COVID-19 patient within 14 days of symptom onset.   Since December 2019, Beny and has had upper respiratory infection (URI) or influenza-like illness. Has not been diagnosed with lab-confirmed COVID-19 test      Date(s) of previous URI or influenza-like illness (free-text): January. I do not remember the dates     Symptoms Beny experienced during previous URI or influenza-like illness as reported by the patient (free-text): Sore throat, aches, stuffiness, coughing, severe pain in lower right side of chest.        Pertinent medical history  Beny needs a return to work/school note.   Weight: 215 lbs   Beny does not smoke or use smokeless tobacco.   Weight: 215 lbs    MEDICATIONS: omeprazole oral, atorvastatin oral, ALLERGIES: NKDA  Clinician Response:  Dear Beny,   Your symptoms show that you may have coronavirus (COVID-19). This illness can cause fever, cough and trouble breathing. Many people get a mild case and get better on their own. Some people can get very sick.  What should I do?  We would like to test you for this virus.   1. Please call [phone] to schedule your visit. Explain that [facility]-19 test. Be ready to share your [facility] visit ID number.  The following will serve as your written order for this COVID Test, ordered by me, for the indication of suspected COVID [Z20.828]: The test will be ordered in [employer], our electronic health record, after you are scheduled. It will show as ordered and authorized by [place]  "MD Rolf.  Order: COVID-19 (Coronavirus) PCR for SYMPTOMATIC testing from [facility].      2. When it's time for your COVID test:  Stay at least 6 feet away from others. (If someone will drive you to your test, stay in the backseat, as far away from the  as you can.)   Cover your mouth and nose with a mask, tissue or washcloth.  Go straight to the testing site. Don't make any stops on the way there or back.      3.Starting now: Stay home and away from others (self-isolate) until:   You've had no fever---and no medicine that reduces fever---for one full day (24 hours). And...   Your other symptoms have gotten better. For example, your cough or breathing has improved. And...   At least 10 days have passed since your symptoms started.       During this time, don't leave the house except for testing or medical care.   Stay in your own room, even for meals. Use your own bathroom if you can.   Stay away from others in your home. No hugging, kissing or shaking hands. No visitors.  Don't go to work, school or anywhere else.    Clean \"high touch\" surfaces often (doorknobs, counters, handles, etc.). Use a household cleaning spray or wipes. You'll find a full list of  on the EPA website: www.epa.gov/pesticide-registration/list-n-disinfectants-use-against-sars-cov-2.   Cover your mouth and nose with a mask, tissue or washcloth to avoid spreading germs.  Wash your hands and face often. Use soap and water.  Caregivers in these groups are at risk for severe illness due to COVID-19:  o People 65 years and older  o People who live in a nursing home or long-term care facility  o People with chronic disease (lung, heart, cancer, diabetes, kidney, liver, immunologic)  o People who have a weakened immune system, including those who:   Are in cancer treatment  Take medicine that weakens the immune system, such as corticosteroids  Had a bone marrow or organ transplant  Have an immune deficiency  Have poorly controlled HIV or AIDS "  Are obese (body mass index of 40 or higher)  Smoke regularly   o Caregivers should wear gloves while washing dishes, handling laundry and cleaning bedrooms and bathrooms.  o Use caution when washing and drying laundry: Don't shake dirty laundry, and use the warmest water setting that you can.  o For more tips, go to www.cdc.gov/coronavirus/2019-ncov/downloads/10Things.pdf.    4.Sign up for [facility]. We know it's scary to hear that you might have COVID-19. We want to track your symptoms to make sure you're okay over the next 2 weeks. Please look for an email from [facility]---this is a free, online program that we'll use to keep in touch. To sign up, follow the link in the email. Learn more at [email or web address]  How can I take care of myself?   Get lots of rest. Drink extra fluids (unless a doctor has told you not to).   Take Tylenol (acetaminophen) for fever or pain. If you have liver or kidney problems, ask your family doctor if it's okay to take Tylenol.   Adults can take either:    650 mg (two 325 mg pills) every 4 to 6 hours, or...   1,000 mg (two 500 mg pills) every 8 hours as needed.    Note: Don't take more than 3,000 mg in one day. Acetaminophen is found in many medicines (both prescribed and over-the-counter medicines). Read all labels to be sure you don't take too much.   For children, check the Tylenol bottle for the right dose. The dose is based on the child's age or weight.    If you have other health problems (like cancer, heart failure, an organ transplant or severe kidney disease): Call your specialty clinic if you don't feel better in the next 2 days.       Know when to call 911. Emergency warning signs include:    Trouble breathing or shortness of breath Pain or pressure in the chest that doesn't go away Feeling confused like you haven't felt before, or not being able to wake up Bluish-colored lips or face.  Where can I get more information?   [facility] -- About  COVID-19: [email or web address]   CDC -- What to Do If You're Sick: www.cdc.gov/coronavirus/2019-ncov/about/steps-when-sick.html   CDC -- Ending Home Isolation: www.cdc.gov/coronavirus/2019-ncov/hcp/disposition-in-home-patients.html   [facility] -- Caring for Someone: www.cdc.gov/coronavirus/2019-ncov/if-you-are-sick/care-for-someone.html   [facility] -- Interim Guidance for Hospital Discharge to Home: www.[facility].[place]./diseases/coronavirus/hcp/hospdischarge.pdf   [facility] clinical trials (COVID-19 research studies): clinicalaffairs.[facility]-clinical-trials    Below are the COVID-19 hotlines at the [place] Department of Health ([facility]). Interpreters are available.    For health questions: Call 169-499-7670 or 1-508.264.5229 (7 a.m. to 7 p.m.) For questions about schools and childcare: Call 564-474-9170 or 1-833.425.6258 (7 a.m. to 7 p.m.)    Diagnosis: Acute upper respiratory infection, unspecified  Diagnosis ICD: J06.9  Additional Clinician Notes:   If your symptoms are not improving or worsen, please go to one of our urgent care locations for evaluation and possible lab work.

## 2020-10-12 NOTE — RESULT ENCOUNTER NOTE
Dear Beny,    Here is a summary of your recent test results:  -Normal red blood cell (hgb) levels, normal white blood cell count and normal platelet levels.  -PSA (prostate specific antigen) test is normal.  This indicates a low likelihood of prostate cancer.  ADVISE: rechecking this in 1 year.  -Cholesterol levels are at your goal levels.  ADVISE: continuing your medication, a regular exercise program with at least 150 minutes of aerobic exercise per week, and eating a low saturated fat/low carbohydrate diet.  Also, you should recheck this fasting cholesterol panel in 12 months.  -Liver and gallbladder tests are normal (ALT,AST, Alk phos, bilirubin), kidney function is normal (Cr, GFR), sodium is normal, potassium is normal, calcium is normal, glucose is normal.    For additional lab test information, labtestsonline.org is an excellent reference.           Thank you very much for trusting me and [facility].     Have a peaceful day.    Healthy regards,  Anand Ramos MD  
80

## 2020-10-13 ENCOUNTER — AMBULATORY - HEALTHEAST (OUTPATIENT)
Dept: FAMILY MEDICINE | Facility: CLINIC | Age: 60
End: 2020-10-13

## 2020-10-13 DIAGNOSIS — Z20.822 SUSPECTED COVID-19 VIRUS INFECTION: ICD-10-CM

## 2020-10-14 ENCOUNTER — AMBULATORY - HEALTHEAST (OUTPATIENT)
Dept: FAMILY MEDICINE | Facility: CLINIC | Age: 60
End: 2020-10-14

## 2020-10-14 DIAGNOSIS — Z20.822 SUSPECTED COVID-19 VIRUS INFECTION: ICD-10-CM

## 2020-10-16 ENCOUNTER — COMMUNICATION - HEALTHEAST (OUTPATIENT)
Dept: SCHEDULING | Facility: CLINIC | Age: 60
End: 2020-10-16

## 2021-01-15 ENCOUNTER — HEALTH MAINTENANCE LETTER (OUTPATIENT)
Age: 61
End: 2021-01-15

## 2021-09-04 ENCOUNTER — HEALTH MAINTENANCE LETTER (OUTPATIENT)
Age: 61
End: 2021-09-04

## 2021-10-30 ENCOUNTER — HEALTH MAINTENANCE LETTER (OUTPATIENT)
Age: 61
End: 2021-10-30

## 2021-11-23 DIAGNOSIS — I25.10 CORONARY ARTERY DISEASE INVOLVING NATIVE HEART WITHOUT ANGINA PECTORIS, UNSPECIFIED VESSEL OR LESION TYPE: ICD-10-CM

## 2021-11-23 NOTE — TELEPHONE ENCOUNTER
Reason for Call:  Medication or medication refill:    Do you use a [facility]?  Name of the pharmacy and phone number for the current request: [facility] [id number] [place] [facility], [place] - [address]     Name of the medication requested: atorvastatin (LIPITOR) 40 MG tablet    Other request: Patient is calling in today in regards to being out of his medication. Patient is scheduled to be seen on 12/13/21 and only has few pills left. Can patient get a refill to get him by tell his appointment? Thank you    Can we leave a detailed message on this number? YES    Phone number patient can be reached at: Home number on file [phone] (home)    Best Time: anytime    Call taken on 11/23/2021 at 3:48 PM by Christelle Noel

## 2021-11-26 RX ORDER — ATORVASTATIN CALCIUM 40 MG/1
40 TABLET, FILM COATED ORAL DAILY
Qty: 30 TABLET | Refills: 0 | Status: SHIPPED | OUTPATIENT
Start: 2021-11-26 | End: 2021-12-13

## 2021-11-26 NOTE — TELEPHONE ENCOUNTER
Sheila refill given    Future Appointments   Date Time Provider Department Center   12/13/2021  7:40 AM Nhan Ramos MD [facility]     Conrad MUÑOZ RN   [facility]

## 2021-12-13 ENCOUNTER — OFFICE VISIT (OUTPATIENT)
Dept: FAMILY MEDICINE | Facility: CLINIC | Age: 61
End: 2021-12-13
Payer: COMMERCIAL

## 2021-12-13 VITALS
BODY MASS INDEX: 28.23 KG/M2 | SYSTOLIC BLOOD PRESSURE: 124 MMHG | DIASTOLIC BLOOD PRESSURE: 78 MMHG | TEMPERATURE: 97.1 F | WEIGHT: 220 LBS | RESPIRATION RATE: 12 BRPM | OXYGEN SATURATION: 98 % | HEIGHT: 74 IN | HEART RATE: 75 BPM

## 2021-12-13 DIAGNOSIS — Z23 HIGH PRIORITY FOR 2019-NCOV VACCINE: ICD-10-CM

## 2021-12-13 DIAGNOSIS — E78.5 HYPERLIPIDEMIA LDL GOAL <70: ICD-10-CM

## 2021-12-13 DIAGNOSIS — Z86.0100 HISTORY OF COLONIC POLYPS: ICD-10-CM

## 2021-12-13 DIAGNOSIS — I25.10 CORONARY ARTERY DISEASE INVOLVING NATIVE HEART WITHOUT ANGINA PECTORIS, UNSPECIFIED VESSEL OR LESION TYPE: ICD-10-CM

## 2021-12-13 DIAGNOSIS — Z12.11 SCREEN FOR COLON CANCER: ICD-10-CM

## 2021-12-13 DIAGNOSIS — K21.9 GASTROESOPHAGEAL REFLUX DISEASE WITHOUT ESOPHAGITIS: ICD-10-CM

## 2021-12-13 DIAGNOSIS — Z12.5 SCREENING FOR PROSTATE CANCER: ICD-10-CM

## 2021-12-13 DIAGNOSIS — Z00.00 ROUTINE GENERAL MEDICAL EXAMINATION AT A HEALTH CARE FACILITY: Primary | ICD-10-CM

## 2021-12-13 LAB
ALBUMIN SERPL-MCNC: 3.7 G/DL (ref 3.4–5)
ALP SERPL-CCNC: 74 U/L (ref 40–150)
ALT SERPL W P-5'-P-CCNC: 37 U/L (ref 0–70)
ANION GAP SERPL CALCULATED.3IONS-SCNC: 2 MMOL/L (ref 3–14)
AST SERPL W P-5'-P-CCNC: 20 U/L (ref 0–45)
BILIRUB SERPL-MCNC: 1.6 MG/DL (ref 0.2–1.3)
BUN SERPL-MCNC: 16 MG/DL (ref 7–30)
CALCIUM SERPL-MCNC: 9.1 MG/DL (ref 8.5–10.1)
CHLORIDE BLD-SCNC: 107 MMOL/L (ref 94–109)
CO2 SERPL-SCNC: 30 MMOL/L (ref 20–32)
CREAT SERPL-MCNC: 1.05 MG/DL (ref 0.66–1.25)
ERYTHROCYTE [DISTWIDTH] IN BLOOD BY AUTOMATED COUNT: 12.2 % (ref 10–15)
GFR SERPL CREATININE-BSD FRML MDRD: 76 ML/MIN/1.73M2
GLUCOSE BLD-MCNC: 106 MG/DL (ref 70–99)
HCT VFR BLD AUTO: 45 % (ref 40–53)
HGB BLD-MCNC: 15.4 G/DL (ref 13.3–17.7)
MCH RBC QN AUTO: 31.2 PG (ref 26.5–33)
MCHC RBC AUTO-ENTMCNC: 34.2 G/DL (ref 31.5–36.5)
MCV RBC AUTO: 91 FL (ref 78–100)
PLATELET # BLD AUTO: 266 10E3/UL (ref 150–450)
POTASSIUM BLD-SCNC: 4.3 MMOL/L (ref 3.4–5.3)
PROT SERPL-MCNC: 7.7 G/DL (ref 6.8–8.8)
PSA SERPL-MCNC: 1.33 UG/L (ref 0–4)
RBC # BLD AUTO: 4.93 10E6/UL (ref 4.4–5.9)
SODIUM SERPL-SCNC: 139 MMOL/L (ref 133–144)
WBC # BLD AUTO: 7.1 10E3/UL (ref 4–11)

## 2021-12-13 PROCEDURE — 0064A COVID-19,PF,MODERNA (18+ YRS BOOSTER .25ML): CPT | Performed by: FAMILY MEDICINE

## 2021-12-13 PROCEDURE — 80053 COMPREHEN METABOLIC PANEL: CPT | Performed by: FAMILY MEDICINE

## 2021-12-13 PROCEDURE — G0103 PSA SCREENING: HCPCS | Performed by: FAMILY MEDICINE

## 2021-12-13 PROCEDURE — 90471 IMMUNIZATION ADMIN: CPT | Performed by: FAMILY MEDICINE

## 2021-12-13 PROCEDURE — 85027 COMPLETE CBC AUTOMATED: CPT | Performed by: FAMILY MEDICINE

## 2021-12-13 PROCEDURE — 91306 COVID-19,PF,MODERNA (18+ YRS BOOSTER .25ML): CPT | Performed by: FAMILY MEDICINE

## 2021-12-13 PROCEDURE — 90714 TD VACC NO PRESV 7 YRS+ IM: CPT | Performed by: FAMILY MEDICINE

## 2021-12-13 PROCEDURE — 36415 COLL VENOUS BLD VENIPUNCTURE: CPT | Performed by: FAMILY MEDICINE

## 2021-12-13 PROCEDURE — 80061 LIPID PANEL: CPT | Performed by: FAMILY MEDICINE

## 2021-12-13 PROCEDURE — 99396 PREV VISIT EST AGE 40-64: CPT | Mod: 25 | Performed by: FAMILY MEDICINE

## 2021-12-13 RX ORDER — ATORVASTATIN CALCIUM 40 MG/1
40 TABLET, FILM COATED ORAL DAILY
Qty: 90 TABLET | Refills: 3 | Status: SHIPPED | OUTPATIENT
Start: 2021-12-13 | End: 2022-11-09

## 2021-12-13 ASSESSMENT — ENCOUNTER SYMPTOMS
WEAKNESS: 0
JOINT SWELLING: 0
COUGH: 0
HEARTBURN: 1
NERVOUS/ANXIOUS: 0
BACK PAIN: 1
PARESTHESIAS: 0
HEADACHES: 0
SORE THROAT: 0
ABDOMINAL PAIN: 1
CHILLS: 0
MYALGIAS: 1
DIARRHEA: 0
FEVER: 0
DIZZINESS: 0
ARTHRALGIAS: 0
NAUSEA: 0
EYE PAIN: 0
HEMATOCHEZIA: 0
SHORTNESS OF BREATH: 0
PALPITATIONS: 0
CONSTIPATION: 0
HEMATURIA: 0
FREQUENCY: 1
DYSURIA: 0

## 2021-12-13 ASSESSMENT — PAIN SCALES - GENERAL: PAINLEVEL: NO PAIN (0)

## 2021-12-13 ASSESSMENT — MIFFLIN-ST. JEOR: SCORE: 1859.72

## 2021-12-13 NOTE — PROGRESS NOTES
SUBJECTIVE:   CC: Beny Jerome is an 61 year old male who presents for preventative health visit.     Patient has been advised of split billing requirements and indicates understanding: Yes  Healthy Habits:     Getting at least 3 servings of Calcium per day:  NO    Bi-annual eye exam:  NO    Dental care twice a year:  NO    Sleep apnea or symptoms of sleep apnea:  Excessive snoring and Sleep apnea    Diet:  Regular (no restrictions)    Frequency of exercise:  2-3 days/week    Duration of exercise:  15-30 minutes    Taking medications regularly:  Yes    Medication side effects:  None    PHQ-2 Total Score: 1    Additional concerns today:  No    Hyperlipidemia Follow-Up      Are you regularly taking any medication or supplement to lower your cholesterol?   Yes- Lipitor     Are you having muscle aches or other side effects that you think could be caused by your cholesterol lowering medication?  No      Today's PHQ-2 Score:   PHQ-2 ( 1999 Pfizer) 12/13/2021   Q1: Little interest or pleasure in doing things 1   Q2: Feeling down, depressed or hopeless 0   PHQ-2 Score 1   PHQ-2 Total Score (12-17 Years)- Positive if 3 or more points; Administer PHQ-A if positive -   Q1: Little interest or pleasure in doing things Several days   Q2: Feeling down, depressed or hopeless Not at all   PHQ-2 Score 1       Abuse: Current or Past(Physical, Sexual or Emotional)- NO  Do you feel safe in your environment? YES        Social History     Tobacco Use     Smoking status: Never Smoker     Smokeless tobacco: Never Used   Substance Use Topics     Alcohol use: No     Comment: quit 1990     If you drink alcohol do you typically have >3 drinks per day or >7 drinks per week? No    Alcohol Use 12/13/2021   Prescreen: >3 drinks/day or >7 drinks/week? Not Applicable   Prescreen: >3 drinks/day or >7 drinks/week? -       Last PSA:   PSA   Date Value Ref Range Status   10/12/2020 0.89 0 - 4 ug/L Final     Comment:     Assay Method:  Chemiluminescence  "using Siemens Vista analyzer     Prostate Specific Antigen Screen   Date Value Ref Range Status   12/13/2021 1.33 0.00 - 4.00 ug/L Final       Reviewed orders with patient. Reviewed health maintenance and updated orders accordingly - Yes      Reviewed and updated as needed this visit by clinical staff  Tobacco  Allergies  Meds  Problems  Med Hx  Surg Hx  Fam Hx  Soc Hx         Reviewed and updated as needed this visit by Provider  Tobacco  Allergies  Meds  Problems  Med Hx  Surg Hx  Fam Hx        Review of Systems   Constitutional: Negative for chills and fever.   HENT: Positive for hearing loss. Negative for congestion, ear pain and sore throat.    Eyes: Negative for pain and visual disturbance.   Respiratory: Negative for cough and shortness of breath.    Cardiovascular: Positive for chest pain (left chest wall and tender with rubbing.  unrelated to exertion.  non radiating. ). Negative for palpitations and peripheral edema.   Gastrointestinal: Positive for abdominal pain (see chest pain sectin comments above) and heartburn (takes omeprazole and this helps. no melena). Negative for constipation, diarrhea, hematochezia and nausea.   Genitourinary: Positive for frequency (rare, off and on.). Negative for dysuria, genital sores, hematuria, impotence, penile discharge and urgency.   Musculoskeletal: Positive for back pain and myalgias (right  side of the neck, ). Negative for arthralgias and joint swelling.   Skin: Negative for rash.   Neurological: Negative for dizziness, weakness, headaches and paresthesias.   Psychiatric/Behavioral: Negative for mood changes. The patient is not nervous/anxious.      OBJECTIVE:   /78   Pulse 75   Temp 97.1  F (36.2  C) (Tympanic)   Resp 12   Ht 1.867 m (6' 1.5\")   Wt 99.8 kg (220 lb)   SpO2 98%   BMI 28.63 kg/m    EXAM:  GENERAL: healthy, alert and no distress  EYES: Eyes grossly normal to inspection, PERRL and conjunctivae and sclerae normal  HENT: ear " canals and TM's normal, nose and mouth without ulcers or lesions  NECK: no adenopathy, no asymmetry, masses, or scars and thyroid normal to palpation  RESP: lungs clear to auscultation - no rales, rhonchi or wheezes  BREAST: normal without masses, tenderness or nipple discharge and no palpable axillary masses or adenopathy  CV: regular rate and rhythm, normal S1 S2, no S3 or S4, no murmur, click or rub, no peripheral edema and peripheral pulses strong  ABDOMEN: soft, nontender, no hepatosplenomegaly, no masses and bowel sounds normal   (male): normal male genitalia without lesions or urethral discharge, no hernia  MS: no gross musculoskeletal defects noted, no edema  SKIN: no suspicious lesions or rashes  NEURO: Normal strength and tone, mentation intact and speech normal  PSYCH: mentation appears normal, affect normal/bright  LYMPH: no cervical, supraclavicular, axillary, or inguinal adenopathy  RECTAL: normal sphincter tone, no rectal masses, prostate normal size, smooth, nontender without nodules or masses      ASSESSMENT/PLAN:   Routine general medical examination at a health care facility      Coronary artery disease involving native heart without angina pectoris, unspecified vessel or lesion type  Mild disease, noted on CT angiogram, no history of intervention. Continue with risk factor modification:  - atorvastatin (LIPITOR) 40 MG tablet  Dispense: 90 tablet; Refill: 3    Hyperlipidemia LDL goal <70  Controlled - continue medication(s).  - Lipid panel reflex to direct LDL Fasting  - atorvastatin (LIPITOR) 40 MG tablet  Dispense: 90 tablet; Refill: 3  - Comprehensive metabolic panel (BMP + Alb, Alk Phos, ALT, AST, Total. Bili, TP)  - Lipid panel reflex to direct LDL Fasting  - Comprehensive metabolic panel (BMP + Alb, Alk Phos, ALT, AST, Total. Bili, TP)    Gastroesophageal reflux disease without esophagitis  Stable - continue medication(s).  - omeprazole (PRILOSEC) 20 MG DR capsule  Dispense: 90 capsule;  "Refill: 3  - CBC with platelets  - CBC with platelets    Screening for prostate cancer  - PROSTATE SPEC ANTIGEN SCREEN  - PROSTATE SPEC ANTIGEN SCREEN    High priority for 2019-nCoV vaccine  - COVID-19,PF,MODERNA (18+ Yrs BOOSTER .25mL)    History of colonic polyps  Screen for colon cancer  - Adult Gastro Ref - Procedure Only      COUNSELING:  Reviewed preventive health counseling, as reflected in patient instructions      Estimated body mass index is 28.63 kg/m  as calculated from the following:    Height as of this encounter: 1.867 m (6' 1.5\").    Weight as of this encounter: 99.8 kg (220 lb).  Weight management plan: Discussed healthy diet and exercise guidelines     reports that he has never smoked. He has never used smokeless tobacco.      Return in about 1 year (around 12/13/2022) for wellness exam with fasting labs with Anand Ramos MD.         Anand Ramos MD     [address] [address]  [email or web address]     Office: [phone]     "

## 2021-12-17 LAB
CHOLEST SERPL-MCNC: 155 MG/DL
FASTING STATUS PATIENT QL REPORTED: YES
HDLC SERPL-MCNC: 47 MG/DL
LDLC SERPL CALC-MCNC: 92 MG/DL
NONHDLC SERPL-MCNC: 108 MG/DL
TRIGL SERPL-MCNC: 80 MG/DL

## 2021-12-24 NOTE — RESULT ENCOUNTER NOTE
Dear Beny,    Here is a summary of your recent test results:  -Normal red blood cell (hgb) levels, normal white blood cell count and normal platelet levels.  -PSA (prostate specific antigen) test is normal.  This indicates a low likelihood of prostate cancer.  ADVISE: rechecking this in 1 year.  -Cholesterol levels are at your goal levels.  ADVISE: continuing your medication, a regular exercise program with at least 150 minutes of aerobic exercise per week, and eating a low saturated fat/low carbohydrate diet.  Also, you should recheck this fasting cholesterol panel in 12 months.  -Liver and gallbladder tests (ALT,AST, Alk phos,bilirubin) are normal.  -Kidney function (GFR) is normal.  -Sodium is normal.  -Potassium is normal.  -Calcium is normal.  -Glucose is slight elevated and may be a sign of early diabetes (prediabetes). ADVISE:: eating a low carbohydrate diet, exercising, trying to lose weight (if necessary) and rechecking your glucose level in 12 months.    For additional lab test information, [facility]test[facility]org is an excellent reference.    In addition, here is a list of due or overdue Health Maintenance reminders:  Colorectal Cancer Screening due on 04/16/2020    Please call us at [phone] (or use [employer]) to address the above recommendations if needed.           Thank you very much for trusting me and [facility].     Have a peaceful day.    Healthy regards,  Anand Ramos MD

## 2022-10-22 ENCOUNTER — HEALTH MAINTENANCE LETTER (OUTPATIENT)
Age: 62
End: 2022-10-22

## 2022-11-08 DIAGNOSIS — I25.10 CORONARY ARTERY DISEASE INVOLVING NATIVE HEART WITHOUT ANGINA PECTORIS, UNSPECIFIED VESSEL OR LESION TYPE: ICD-10-CM

## 2022-11-08 DIAGNOSIS — E78.5 HYPERLIPIDEMIA LDL GOAL <70: ICD-10-CM

## 2022-11-09 RX ORDER — ATORVASTATIN CALCIUM 40 MG/1
TABLET, FILM COATED ORAL
Qty: 90 TABLET | Refills: 0 | Status: SHIPPED | OUTPATIENT
Start: 2022-11-09 | End: 2023-01-30

## 2022-12-27 DIAGNOSIS — K21.9 GASTROESOPHAGEAL REFLUX DISEASE WITHOUT ESOPHAGITIS: ICD-10-CM

## 2022-12-28 NOTE — TELEPHONE ENCOUNTER
Please call to schedule an appointment   Then route to PCP for refill     Zena Pascual RN, BSN  [facility] - [facility] [place] [facility] [place]

## 2022-12-29 NOTE — TELEPHONE ENCOUNTER
Appointment scheduled for 01/30/2023.   Obi Diaz       SUBJECTIVE:    Patient is a 89y old Male who presents with a chief complaint of SOB (22 Oct 2019 10:53)    Currently admitted to medicine with the primary diagnosis of Pneumonia due to infectious organism, unspecified laterality, unspecified part of lung     Today is hospital day . This morning he is resting comfortably in bed and reports no new issues or overnight events.     PAST MEDICAL & SURGICAL HISTORY  Hyperlipidemia  CHF (congestive heart failure)  Afib  Hypertension  S/P AAA (abdominal aortic aneurysm) repair    SOCIAL HISTORY:  Negative for smoking/alcohol/drug use.     ALLERGIES:  No Known Allergies    MEDICATIONS:  STANDING MEDICATIONS  acetaminophen  Suppository .. 650 milliGRAM(s) Rectal once  albuterol/ipratropium for Nebulization 3 milliLiter(s) Nebulizer every 6 hours  atorvastatin 10 milliGRAM(s) Oral at bedtime  azithromycin  IVPB 500 milliGRAM(s) IV Intermittent every 24 hours  cefepime   IVPB 1000 milliGRAM(s) IV Intermittent every 24 hours  cholecalciferol Oral Tab/Cap - Peds 1000 Unit(s) Oral daily  levothyroxine 50 MICROGram(s) Oral daily  methylPREDNISolone sodium succinate Injectable 40 milliGRAM(s) IV Push every 8 hours  metoprolol tartrate 12.5 milliGRAM(s) Oral two times a day  pantoprazole  Injectable 40 milliGRAM(s) IV Push daily  tiotropium 18 MICROgram(s) Capsule 1 Capsule(s) Inhalation daily    PRN MEDICATIONS    VITALS:   T(F): 101  HR: 75  BP: 104/57  RR: 24  SpO2: 96%    LABS:                        10.2   13.99 )-----------( 158      ( 22 Oct 2019 09:27 )             35.7     10-    139  |  95<L>  |  50<H>  ----------------------------<  127<H>  4.6   |  27  |  1.5    Ca    9.7      22 Oct 2019 09:27  Mg     1.7     10-    TPro  8.4<H>  /  Alb  4.3  /  TBili  0.8  /  DBili  x   /  AST  17  /  ALT  13  /  AlkPhos  149<H>  10-      Urinalysis Basic - ( 22 Oct 2019 10:47 )    Color: Yellow / Appearance: Clear / S.010 / pH: x  Gluc: x / Ketone: Negative  / Bili: Negative / Urobili: 0.2 mg/dL   Blood: x / Protein: 30 mg/dL / Nitrite: Negative   Leuk Esterase: Small / RBC: x / WBC x   Sq Epi: x / Non Sq Epi: x / Bacteria: x        Lactate, Blood: 2.2 mmol/L (10-22-19 @ 10:47)  Troponin T, Serum: 0.03 ng/mL <HH> (10-22-19 @ 09:27)  Lactate, Blood: 2.7 mmol/L <H> (10-22-19 @ 09:27)      CARDIAC MARKERS ( 22 Oct 2019 09:27 )  x     / 0.03 ng/mL / x     / x     / x          RADIOLOGY:    PHYSICAL EXAM:  GEN: No acute distress  LUNGS: Clear to auscultation bilaterally   HEART: S1/S2 present. RRR.   ABD: Soft, non-tender, non-distended. Bowel sounds present  EXT: NC/NC/NE/2+PP/LANE  NEURO: AAOX3 SUBJECTIVE:    Patient is a 89y old Male who presents with a chief complaint of SOB (22 Oct 2019 10:53)    Currently admitted to medicine with the primary diagnosis of Pneumonia due to infectious organism, unspecified laterality, unspecified part of lung     Today is hospital day . This morning he is resting comfortably in bed and reports no new issues or overnight events.     PAST MEDICAL & SURGICAL HISTORY  Hyperlipidemia  CHF (congestive heart failure)  Afib  Hypertension  S/P AAA (abdominal aortic aneurysm) repair    SOCIAL HISTORY:  Negative for smoking/alcohol/drug use.     ALLERGIES:  No Known Allergies    MEDICATIONS:  STANDING MEDICATIONS  acetaminophen  Suppository .. 650 milliGRAM(s) Rectal once  albuterol/ipratropium for Nebulization 3 milliLiter(s) Nebulizer every 6 hours  atorvastatin 10 milliGRAM(s) Oral at bedtime  azithromycin  IVPB 500 milliGRAM(s) IV Intermittent every 24 hours  cefepime   IVPB 1000 milliGRAM(s) IV Intermittent every 24 hours  cholecalciferol Oral Tab/Cap - Peds 1000 Unit(s) Oral daily  levothyroxine 50 MICROGram(s) Oral daily  methylPREDNISolone sodium succinate Injectable 40 milliGRAM(s) IV Push every 8 hours  metoprolol tartrate 12.5 milliGRAM(s) Oral two times a day  pantoprazole  Injectable 40 milliGRAM(s) IV Push daily  tiotropium 18 MICROgram(s) Capsule 1 Capsule(s) Inhalation daily    PRN MEDICATIONS    VITALS:   T(F): 101  HR: 75  BP: 104/57  RR: 24  SpO2: 96%    LABS:                        10.2   13.99 )-----------( 158      ( 22 Oct 2019 09:27 )             35.7     10-    139  |  95<L>  |  50<H>  ----------------------------<  127<H>  4.6   |  27  |  1.5    Ca    9.7      22 Oct 2019 09:27  Mg     1.7     10-    TPro  8.4<H>  /  Alb  4.3  /  TBili  0.8  /  DBili  x   /  AST  17  /  ALT  13  /  AlkPhos  149<H>  10-      Urinalysis Basic - ( 22 Oct 2019 10:47 )    Color: Yellow / Appearance: Clear / S.010 / pH: x  Gluc: x / Ketone: Negative  / Bili: Negative / Urobili: 0.2 mg/dL   Blood: x / Protein: 30 mg/dL / Nitrite: Negative   Leuk Esterase: Small / RBC: x / WBC x   Sq Epi: x / Non Sq Epi: x / Bacteria: x        Lactate, Blood: 2.2 mmol/L (10-22-19 @ 10:47)  Troponin T, Serum: 0.03 ng/mL <HH> (10-22-19 @ 09:27)  Lactate, Blood: 2.7 mmol/L <H> (10-22-19 @ 09:27)      CARDIAC MARKERS ( 22 Oct 2019 09:27 )  x     / 0.03 ng/mL / x     / x     / x          RADIOLOGY:    PHYSICAL EXAM:  GEN: No acute distress  LUNGS: bilateral crackles/ rales  HEART: S1/S2 present. RRR.   ABD: Soft, non-tender, non-distended. Bowel sounds present  EXT: NC/NC/NE/2+PP/LANE  NEURO: AAOX3    < from: Xray Chest 1 View-PORTABLE IMMEDIATE (10.22.19 @ 08:38) >  New bibasilar opacities/pleural effusions with pulmonary vascular   congestion.    < end of copied text >

## 2023-01-30 ENCOUNTER — OFFICE VISIT (OUTPATIENT)
Dept: FAMILY MEDICINE | Facility: CLINIC | Age: 63
End: 2023-01-30
Payer: COMMERCIAL

## 2023-01-30 ENCOUNTER — ANCILLARY PROCEDURE (OUTPATIENT)
Dept: GENERAL RADIOLOGY | Facility: CLINIC | Age: 63
End: 2023-01-30
Attending: FAMILY MEDICINE
Payer: COMMERCIAL

## 2023-01-30 VITALS
RESPIRATION RATE: 14 BRPM | BODY MASS INDEX: 29.42 KG/M2 | DIASTOLIC BLOOD PRESSURE: 78 MMHG | SYSTOLIC BLOOD PRESSURE: 136 MMHG | TEMPERATURE: 97.8 F | OXYGEN SATURATION: 97 % | HEIGHT: 73 IN | WEIGHT: 222 LBS | HEART RATE: 72 BPM

## 2023-01-30 DIAGNOSIS — E78.5 HYPERLIPIDEMIA LDL GOAL <70: ICD-10-CM

## 2023-01-30 DIAGNOSIS — Z12.11 SCREEN FOR COLON CANCER: ICD-10-CM

## 2023-01-30 DIAGNOSIS — I25.10 CORONARY ARTERY DISEASE INVOLVING NATIVE HEART WITHOUT ANGINA PECTORIS, UNSPECIFIED VESSEL OR LESION TYPE: ICD-10-CM

## 2023-01-30 DIAGNOSIS — G47.19 EXCESSIVE DAYTIME SLEEPINESS: ICD-10-CM

## 2023-01-30 DIAGNOSIS — M25.551 HIP PAIN, RIGHT: ICD-10-CM

## 2023-01-30 DIAGNOSIS — K21.9 GASTROESOPHAGEAL REFLUX DISEASE WITHOUT ESOPHAGITIS: ICD-10-CM

## 2023-01-30 DIAGNOSIS — Z12.5 SCREENING FOR PROSTATE CANCER: ICD-10-CM

## 2023-01-30 DIAGNOSIS — Z00.00 ROUTINE GENERAL MEDICAL EXAMINATION AT A HEALTH CARE FACILITY: Primary | ICD-10-CM

## 2023-01-30 DIAGNOSIS — R06.83 SNORING: ICD-10-CM

## 2023-01-30 LAB
ALBUMIN SERPL BCG-MCNC: 4.4 G/DL (ref 3.5–5.2)
ALP SERPL-CCNC: 80 U/L (ref 40–129)
ALT SERPL W P-5'-P-CCNC: 24 U/L (ref 10–50)
ANION GAP SERPL CALCULATED.3IONS-SCNC: 11 MMOL/L (ref 7–15)
AST SERPL W P-5'-P-CCNC: 27 U/L (ref 10–50)
BILIRUB SERPL-MCNC: 1 MG/DL
BUN SERPL-MCNC: 11.6 MG/DL (ref 8–23)
CALCIUM SERPL-MCNC: 9.4 MG/DL (ref 8.8–10.2)
CHLORIDE SERPL-SCNC: 105 MMOL/L (ref 98–107)
CHOLEST SERPL-MCNC: 156 MG/DL
CREAT SERPL-MCNC: 1.03 MG/DL (ref 0.67–1.17)
DEPRECATED HCO3 PLAS-SCNC: 26 MMOL/L (ref 22–29)
ERYTHROCYTE [DISTWIDTH] IN BLOOD BY AUTOMATED COUNT: 12.9 % (ref 10–15)
GFR SERPL CREATININE-BSD FRML MDRD: 82 ML/MIN/1.73M2
GLUCOSE SERPL-MCNC: 98 MG/DL (ref 70–99)
HCT VFR BLD AUTO: 43.9 % (ref 40–53)
HDLC SERPL-MCNC: 48 MG/DL
HGB BLD-MCNC: 14.9 G/DL (ref 13.3–17.7)
LDLC SERPL CALC-MCNC: 96 MG/DL
MCH RBC QN AUTO: 30.5 PG (ref 26.5–33)
MCHC RBC AUTO-ENTMCNC: 33.9 G/DL (ref 31.5–36.5)
MCV RBC AUTO: 90 FL (ref 78–100)
NONHDLC SERPL-MCNC: 108 MG/DL
PLATELET # BLD AUTO: 265 10E3/UL (ref 150–450)
POTASSIUM SERPL-SCNC: 4.5 MMOL/L (ref 3.4–5.3)
PROT SERPL-MCNC: 7.3 G/DL (ref 6.4–8.3)
PSA SERPL-MCNC: 1.21 NG/ML (ref 0–4.5)
RBC # BLD AUTO: 4.88 10E6/UL (ref 4.4–5.9)
SODIUM SERPL-SCNC: 142 MMOL/L (ref 136–145)
TRIGL SERPL-MCNC: 59 MG/DL
WBC # BLD AUTO: 6.8 10E3/UL (ref 4–11)

## 2023-01-30 PROCEDURE — 80053 COMPREHEN METABOLIC PANEL: CPT | Performed by: FAMILY MEDICINE

## 2023-01-30 PROCEDURE — 80061 LIPID PANEL: CPT | Performed by: FAMILY MEDICINE

## 2023-01-30 PROCEDURE — G0103 PSA SCREENING: HCPCS | Performed by: FAMILY MEDICINE

## 2023-01-30 PROCEDURE — 90682 RIV4 VACC RECOMBINANT DNA IM: CPT | Performed by: FAMILY MEDICINE

## 2023-01-30 PROCEDURE — 99214 OFFICE O/P EST MOD 30 MIN: CPT | Mod: 25 | Performed by: FAMILY MEDICINE

## 2023-01-30 PROCEDURE — 73502 X-RAY EXAM HIP UNI 2-3 VIEWS: CPT | Mod: TC | Performed by: RADIOLOGY

## 2023-01-30 PROCEDURE — 36415 COLL VENOUS BLD VENIPUNCTURE: CPT | Performed by: FAMILY MEDICINE

## 2023-01-30 PROCEDURE — 85027 COMPLETE CBC AUTOMATED: CPT | Performed by: FAMILY MEDICINE

## 2023-01-30 PROCEDURE — 99396 PREV VISIT EST AGE 40-64: CPT | Mod: 25 | Performed by: FAMILY MEDICINE

## 2023-01-30 PROCEDURE — 90471 IMMUNIZATION ADMIN: CPT | Performed by: FAMILY MEDICINE

## 2023-01-30 RX ORDER — ATORVASTATIN CALCIUM 40 MG/1
40 TABLET, FILM COATED ORAL DAILY
Qty: 90 TABLET | Refills: 3 | Status: SHIPPED | OUTPATIENT
Start: 2023-01-30 | End: 2024-02-21

## 2023-01-30 ASSESSMENT — PAIN SCALES - GENERAL: PAINLEVEL: NO PAIN (1)

## 2023-01-30 NOTE — PROGRESS NOTES
"  {PROVIDER CHARTING PREFERENCE:160854}    Subjective   Beny is a 62 year old, presenting for the following health issues:  Recheck Medication and Physical      History of Present Illness       Reason for visit:  Check up and hip pain    He eats 2-3 servings of fruits and vegetables daily.He consumes 2 sweetened beverage(s) daily.He exercises with enough effort to increase his heart rate 10 to 19 minutes per day.  He exercises with enough effort to increase his heart rate 5 days per week.   He is taking medications regularly.  He is not taking prescribed medications regularly due to None.  Healthy Habits:     Getting at least 3 servings of Calcium per day:  NO    Bi-annual eye exam:  Yes    Dental care twice a year:  NO    Diet:  Regular (no restrictions)    Taking medications regularly:  0    Barriers to taking medications:  None    Medication side effects:  None    PHQ-2 Total Score: 0    Additional concerns today:  Yes       Hyperlipidemia Follow-Up      Are you regularly taking any medication or supplement to lower your cholesterol?   Yes- atorvastatin (LIPITOR) 40 MG tablet    Are you having muscle aches or other side effects that you think could be caused by your cholesterol lowering medication?  No    {additonal problems for provider to add (Optional):424718}    Review of Systems   {ROS COMP (Optional):117056}      Objective    /78   Pulse 72   Temp 97.8  F (36.6  C) (Tympanic)   Resp 14   Ht 1.854 m (6' 1\")   Wt 100.7 kg (222 lb)   SpO2 97%   BMI 29.29 kg/m    Body mass index is 29.29 kg/m .  Physical Exam   {Exam List (Optional):723524}    {Diagnostic Test Results (Optional):440687}    {AMBULATORY ATTESTATION (Optional):823511}            "

## 2023-01-30 NOTE — PROGRESS NOTES
SUBJECTIVE:   CC: Beny Jerome is an 62 year old male who presents for preventive health visit.     Patient has been advised of split billing requirements and indicates understanding: Yes      Healthy Habits:     Taking medications regularly:  0    PHQ-2 Total Score: 0  History of Present Illness       Reason for visit:  Check up and     Right hip pain - pain with walking.  Some pain while sittting.  No injury. No other joint ache at times.   No family history of arthritis.     He eats 2-3 servings of fruits and vegetables daily.He consumes 2 sweetened beverage(s) daily.He exercises with enough effort to increase his heart rate 10 to 19 minutes per day.  He exercises with enough effort to increase his heart rate 5 days per week.   He is taking medications regularly.      Healthy Habits:no, taking calcium and/or vitamin D supplement: no    Problems taking medications regularly No    Medication side effects: No    Have you had an eye exam in the past two years? yes    Do you see a dentist twice per year? no    Do you have sleep apnea, excessive snoring or daytime drowsiness?yes    Reason for visit:  Check up and hip pain    He eats 2-3 servings of fruits and vegetables daily.He consumes 2 sweetened beverage(s) daily.He exercises with enough effort to increase his heart rate 10 to 19 minutes per day.  He exercises with enough effort to increase his heart rate 5 days per week.   He is taking medications regularly.  He is not taking prescribed medications regularly due to None.  Healthy Habits:     Getting at least 3 servings of Calcium per day:  NO    Bi-annual eye exam:  Yes    Dental care twice a year:  NO    Diet:  Regular (no restrictions)    Taking medications regularly:  0    Barriers to taking medications:  None    Medication side effects:  None    PHQ-2 Total Score: 0    Additional concerns today:  Yes       Hyperlipidemia Follow-Up      Are you regularly taking any medication or supplement to lower your  "cholesterol?   Yes- atorvastatin (LIPITOR) 40 MG tablet    Are you having muscle aches or other side effects that you think could be caused by your cholesterol lowering medication?  No      Today's PHQ-2 Score:   PHQ-2 ( 1999 Pfizer) 1/29/2023 12/13/2021   Q1: Little interest or pleasure in doing things 0 1   Q2: Feeling down, depressed or hopeless 0 0   PHQ-2 Score 0 1   PHQ-2 Total Score (12-17 Years)- Positive if 3 or more points; Administer PHQ-A if positive - -   Q1: Little interest or pleasure in doing things Not at all Several days   Q2: Feeling down, depressed or hopeless Not at all Not at all   PHQ-2 Score 0 1     Abuse: Current or Past(Physical, Sexual or Emotional)- No  Do you feel safe in your environment? Yes        Social History     Tobacco Use     Smoking status: Never     Smokeless tobacco: Never   Substance Use Topics     Alcohol use: No     Comment: quit 1990     If you drink alcohol do you typically have >3 drinks per day or >7 drinks per week? No                      Last PSA:   PSA   Date Value Ref Range Status   10/12/2020 0.89 0 - 4 ug/L Final     Comment:     Assay Method:  Chemiluminescence using Siemens Vista analyzer     Prostate Specific Antigen Screen   Date Value Ref Range Status   12/13/2021 1.33 0.00 - 4.00 ug/L Final       Reviewed orders with patient. Reviewed health maintenance and updated orders accordingly - Yes  Reviewed and updated as needed this visit by clinical staff   Tobacco  Allergies  Meds  Problems  Med Hx  Surg Hx  Fam Hx          Reviewed and updated as needed this visit by Provider   Tobacco  Allergies  Meds  Problems  Med Hx  Surg Hx  Fam Hx           ROS:  Review of Systems   Constitutional, HEENT, cardiovascular, pulmonary, GI, , musculoskeletal, neuro, skin, endocrine and psych systems are negative, except as otherwise noted.  OBJECTIVE:   /78   Pulse 72   Temp 97.8  F (36.6  C) (Tympanic)   Resp 14   Ht 1.854 m (6' 1\")   Wt 100.7 kg " (222 lb)   SpO2 97%   BMI 29.29 kg/m    EXAM:  GENERAL: healthy, alert and no distress  EYES: Eyes grossly normal to inspection, PERRL and conjunctivae and sclerae normal  HENT: ear canals and TM's normal, nose and mouth without ulcers or lesions  NECK: no adenopathy, no asymmetry, masses, or scars and thyroid normal to palpation  RESP: lungs clear to auscultation - no rales, rhonchi or wheezes  BREAST: normal without masses, tenderness or nipple discharge and no palpable axillary masses or adenopathy  CV: regular rate and rhythm, normal S1 S2, no S3 or S4, no murmur, click or rub, no peripheral edema and peripheral pulses strong  ABDOMEN: soft, nontender, no hepatosplenomegaly, no masses and bowel sounds normal   (male): normal male genitalia without lesions or urethral discharge, no hernia  MS: mild pain with internal rotation of the hip  Ok flexion otherwise no gross musculoskeletal defects noted, no edema  SKIN: no suspicious lesions or rashes  NEURO: Normal strength and tone, mentation intact and speech normal  PSYCH: mentation appears normal, affect normal/bright  LYMPH: no cervical, supraclavicular, axillary, or inguinal adenopathy  RECTAL: normal sphincter tone, no rectal masses, prostate normal size, smooth, nontender without nodules or masses      ASSESSMENT/PLAN:   Routine general medical examination at a health care facility      Coronary artery disease involving native heart without angina pectoris, unspecified vessel or lesion type  Mild disease noted on previous CTA and will continue with statins  - atorvastatin (LIPITOR) 40 MG tablet  Dispense: 90 tablet; Refill: 3    Hyperlipidemia LDL goal <70  Controlled - continue medication(s).  - COMPREHENSIVE METABOLIC PANEL  - Lipid panel reflex to direct LDL Non-fasting  - atorvastatin (LIPITOR) 40 MG tablet  Dispense: 90 tablet; Refill: 3  - COMPREHENSIVE METABOLIC PANEL  - Lipid panel reflex to direct LDL Non-fasting    Gastroesophageal reflux disease  "without esophagitis  Controlled - continue medication(s).  - CBC with Platelets  - omeprazole (PRILOSEC) 20 MG DR capsule  Dispense: 90 capsule; Refill: 3  - CBC with Platelets    Screening for prostate cancer  - PROSTATE SPEC ANTIGEN SCREEN  - PROSTATE SPEC ANTIGEN SCREEN    Screen for colon cancer  - Colonoscopy Screening  Referral    Excessive daytime sleepiness  Snoring   Ongoing snoring was never able to finish previous sleep apnea referral and will be referred.  - Adult Sleep Eval & Management  Referral    Hip pain, right  Mild DJD on x-ray, weight loss can help, range of motion exercises recommended.  Consider physical therapy if worsening and/or Ortho consultation.  - XR Hip Right 2-3 Views      COUNSELING:  Reviewed preventive health counseling, as reflected in patient instructions    Estimated body mass index is 29.29 kg/m  as calculated from the following:    Height as of this encounter: 1.854 m (6' 1\").    Weight as of this encounter: 100.7 kg (222 lb).  Weight management plan: Discussed healthy diet and exercise guidelines     reports that he has never smoked. He has never used smokeless tobacco.      Return in about 1 year (around 1/30/2024) for wellness exam with fasting labs with Anand Ramos MD.           Anand Ramos MD     [address] [address]  [email or web address]     Office: [phone]     "

## 2023-02-01 NOTE — RESULT ENCOUNTER NOTE
Dear Beny,    Here is a summary of your recent test results:  -Normal red blood cell (hgb) levels, normal white blood cell count and normal platelet levels.  -PSA (prostate specific antigen) test is normal.  This indicates a low likelihood of prostate cancer.  ADVISE: rechecking this in 1 year.  -Cholesterol levels are at your goal levels.  ADVISE: continuing your medication, a regular exercise program with at least 150 minutes of aerobic exercise per week, and eating a low saturated fat/low carbohydrate diet.  Also, you should recheck this fasting cholesterol panel in 12 months.  -Liver and gallbladder tests are normal (ALT,AST, Alk phos, bilirubin), kidney function is normal (Cr, GFR), sodium is normal, potassium is normal, calcium is normal, glucose is normal.    For additional lab test information, www.testing.com is a very good reference.    In addition, here is a list of due or overdue Health Maintenance reminders:  Pneumococcal Vaccine(1 - PCV) Never done  Colorectal Cancer Screening due on 04/16/2020  Please call us at [phone] (or use [employer] Education [employer]) to address the above recommendations if needed.           Thank you very much for trusting me and [facility].     Have a peaceful day.    Healthy regards,  Anand Ramos MD

## 2023-03-12 ASSESSMENT — SLEEP AND FATIGUE QUESTIONNAIRES
HOW LIKELY ARE YOU TO NOD OFF OR FALL ASLEEP IN A CAR, WHILE STOPPED FOR A FEW MINUTES IN TRAFFIC: SLIGHT CHANCE OF DOZING
HOW LIKELY ARE YOU TO NOD OFF OR FALL ASLEEP WHILE SITTING QUIETLY AFTER LUNCH WITHOUT ALCOHOL: HIGH CHANCE OF DOZING
HOW LIKELY ARE YOU TO NOD OFF OR FALL ASLEEP WHILE LYING DOWN TO REST IN THE AFTERNOON WHEN CIRCUMSTANCES PERMIT: HIGH CHANCE OF DOZING
HOW LIKELY ARE YOU TO NOD OFF OR FALL ASLEEP WHILE WATCHING TV: HIGH CHANCE OF DOZING
HOW LIKELY ARE YOU TO NOD OFF OR FALL ASLEEP WHILE SITTING INACTIVE IN A PUBLIC PLACE: MODERATE CHANCE OF DOZING
HOW LIKELY ARE YOU TO NOD OFF OR FALL ASLEEP WHILE SITTING AND TALKING TO SOMEONE: SLIGHT CHANCE OF DOZING
HOW LIKELY ARE YOU TO NOD OFF OR FALL ASLEEP WHEN YOU ARE A PASSENGER IN A CAR FOR AN HOUR WITHOUT A BREAK: SLIGHT CHANCE OF DOZING
HOW LIKELY ARE YOU TO NOD OFF OR FALL ASLEEP WHILE SITTING AND READING: SLIGHT CHANCE OF DOZING

## 2023-03-13 ENCOUNTER — VIRTUAL VISIT (OUTPATIENT)
Dept: SLEEP MEDICINE | Facility: CLINIC | Age: 63
End: 2023-03-13
Attending: FAMILY MEDICINE
Payer: COMMERCIAL

## 2023-03-13 VITALS — WEIGHT: 217 LBS | HEIGHT: 72 IN | BODY MASS INDEX: 29.39 KG/M2

## 2023-03-13 DIAGNOSIS — R06.83 SNORING: ICD-10-CM

## 2023-03-13 DIAGNOSIS — G47.19 EXCESSIVE DAYTIME SLEEPINESS: ICD-10-CM

## 2023-03-13 DIAGNOSIS — G47.9 SLEEP DISTURBANCE: Primary | ICD-10-CM

## 2023-03-13 PROCEDURE — 99213 OFFICE O/P EST LOW 20 MIN: CPT | Mod: 93 | Performed by: INTERNAL MEDICINE

## 2023-03-13 ASSESSMENT — PAIN SCALES - GENERAL: PAINLEVEL: MILD PAIN (2)

## 2023-03-13 NOTE — PROGRESS NOTES
Beny Jerome is a 63-year-old male who is being evaluated via a billable telephone visit.       Telephone-Visit Details     Type of service:  Telephone Visit     Telephone number :  [phone]    Telephone Start Time: 12:11 PM     Telephone End Time: 12:28 PM    Originating Location (pt. Location): Home    Distant Location (provider location):  Off-site      [facility]  Sleep clinic follow-up visit note      Date on this visit:  3/13/23     Chief complaint:  Snoring, discuss sleep study  Beny Jerome is a 63 year old male with PMH of CAD, GERD and HLD, who presents to sleep clinic via telephone visit today with concerns about snoring and nonrestorative sleep.  He was last seen at the sleep clinic in September 2020 when a sleep study was recommended but with the study was not completed.  Today he presents to sleep clinic with similar concerns and is interested in obtaining sleep study.      Sleep schedule: He works at a [employer]  Winter bed time: 10pm;Wake time:530am  Summer time bedtime: 830-930pm; Wake time: 4-430 am  Sleep onset: usually within 30 minutes.  However he reports difficulty falling asleep during summertime.  Nocturnal awakenings: 2-3 times/night typically, usually nocturia, reports difficulty falling back asleep especially when he wakes in the early mornings  He is not always rested upon awakening in the morning. He reports fatigue and EDS, He reports that right after lunch while at work sitting at his desk he dozes off for a few minutes.  He denies drowsiness while driving.    Sleep-related breathing:  Snoring/Apneas: reports snoring, witnessed apneas by his wife  Choking/Gasping: sometimes gasping according to his wife  Morning headaches: only on days when his snoring is too bad per his wife  Dry mouth: No    He denies sleepwalking, sleep eating, sleep talking or dream enactment behavior.    Sleep scores  ESS:  15/24  DIANNE:  18    Past medical/surgical history, family history, social  history, medications and allergies were reviewed.      Problem list:  Patient Active Problem List   Diagnosis     GERD (gastroesophageal reflux disease)     CAD (coronary artery disease)     Hyperlipidemia LDL goal <70     Bilateral low back pain without sciatica, unspecified chronicity     Snoring     Excessive daytime sleepiness             Physical Examination:   Per EMR Ht 1.829 m (6')   Wt 98.4 kg (217 lb)   BMI 29.43 kg/m    General: Pleasant. Cooperative. In no apparent distress.  Pulmonary: Able to speak in full sentences easily. No cough or wheeze.   Neurologic: Alert, oriented x3.  Psychiatric: Mood euthymic. Affect congruent with full range and intensity.          Assessment and Plan:   Snoring, observed apneas during sleep, nonrestorative sleep with fatigue and excessive daytime sleepiness.  Possible obstructive sleep apnea.  STOP-BAN (snoring, fatigue/EDS, observed apneas, age, gender)  We reviewed home sleep study and polysomnography.  Patient interested in obtaining polysomnography.  Orders generated in epic for PSG to evaluate for SVETLANA.  Since patient reports excessive daytime sleepiness and has been dozing off at work during the lunch hour in the setting of CAD, even though he denies drowsiness while driving, I have marked the study as URGENT. Plan is to communicate the test results with the patient via [employer].  Consequences of untreated sleep apnea were reviewed.  Information regarding different treatment options for SVETLANA was provided as after visit summary.    Insomnia: With regards to the initiation insomnia that he reports when his work hours change during the summertime, patient was instructed to work towards adjusting his sleep schedule gradually starting approximately 1 month before he starts the summer schedule.  We discussed avoiding mind stimulating activities and avoiding use of electronic devices at least within 1 hour before the goal bedtime.  We discussed minimizing exposure to  "bright light a couple hours before the goal bedtime.  Recommended following a similar schedule every day of the week optimizing the duration and circadian timing of sleep with the aim of obtaining 7 to 8 hours of sleep per night avoiding sleep deprivation.    Overweight: We discussed weight management with diet and exercise.    Patient was strongly advised to avoid driving, operating any heavy machinery or other hazardous situations while drowsy or sleepy.  Patient was counseled on the importance of driving while alert, to pull over if drowsy, or nap before getting into the vehicle if sleepy.     The above note was dictated using voice recognition software. Although reviewed after completion, some word and grammatical error may remain . Please contact the author for any clarifications.      \" Total time spent was 30 minutes  for this appointment on this date of service which include time spent before, during and after the visit for chart review, patient care, counseling and coordination of care including documentation.\"        "

## 2023-03-13 NOTE — PROGRESS NOTES
"Video-Visit Details    Type of service:  Video Visit    Video Start Time (time video started): ***    Video End Time (time video stopped): ***    Originating Location (pt. Location): {video visit patient location:258407::\"Home\"}    {PROVIDER LOCATION On-site should be selected for visits conducted from your clinic location or adjoining [facility] hospital, academic office, or other nearby [facility] building. Off-site should be selected for all other provider locations, including home:[id number]}    Distant Location (provider location):  {virtual location provider:[id number]}    Mode of Communication:  Video Conference via [employer]        "

## 2023-03-13 NOTE — PATIENT INSTRUCTIONS
"      MY TREATMENT INFORMATION FOR SLEEP APNEA-  Beny Jerome    DOCTOR : Vignesh Pascual MD    Am I having a sleep study at a sleep center?  --->Due to normal delays, you will be contacted within 2-4 weeks to schedule    Frequently asked questions:  1. What is Obstructive Sleep Apnea (SVETLANA)? SVETLANA is the most common type of sleep apnea. Apnea means, \"without breath.\"  Apnea is most often caused by narrowing or collapse of the upper airway as muscles relax during sleep.   Almost everyone has occasional apneas. Most people with sleep apnea have had brief interruptions at night frequently for many years.  The severity of sleep apnea is related to how frequent and severe the events are.   2. What are the consequences of SVETLANA? Symptoms include: feeling sleepy during the day, snoring loudly, gasping or stopping of breathing, trouble sleeping, and occasionally morning headaches or heartburn at night.  Sleepiness can be serious and even increase the risk of falling asleep while driving. Other health consequences may include development of high blood pressure and other cardiovascular disease in persons who are susceptible. Untreated SVETLANA  can contribute to heart disease, stroke and diabetes.   3. What are the treatment options? In most situations, sleep apnea is a lifelong disease that must be managed with daily therapy. Medications are not effective for sleep apnea and surgery is generally not considered until other therapies have been tried. Your treatment is your choice . Continuous Positive Airway (CPAP) works right away and is the therapy that is effective in nearly everyone. An oral device to hold your jaw forward is usually the next most reliable option. Other options include postioning devices (to keep you off your back), weight loss, and surgery including a tongue pacing device. There is more detail about some of these options below.  4. Are my sleep studies covered by insurance? Although we will " request verification of coverage, we advise you also check in advance of the study to ensure there is coverage.    Important tips for those choosing CPAP and similar devices   Know your equipment:  CPAP is continuous positive airway pressure that prevents obstructive sleep apnea by keeping the throat from collapsing while you are sleeping. In most cases, the device is  smart  and can slowly self-adjusts if your throat collapses and keeps a record every day of how well you are treated-this information is available to you and your care team.  BPAP is bilevel positive airway pressure that keeps your throat open and also assists each breath with a pressure boost to maintain adequate breathing.  Special kinds of BPAP are used in patients who have inadequate breathing from lung or heart disease. In most cases, the device is  smart  and can slowly self-adjusts to assist breathing. Like CPAP, the device keeps a record of how well you are treated.  Your mask is your connection to the device. You get to choose what feels most comfortable and the staff will help to make sure if fits. Here: are some examples of the different masks that are available:       Key points to remember on your journey with sleep apnea:  Sleep study.  PAP devices often need to be adjusted during a sleep study to show that they are effective and adjusted right.  Good tips to remember: Try wearing just the mask during a quiet time during the day so your body adapts to wearing it. A humidifier is recommended for comfort in most cases to prevent drying of your nose and throat. Allergy medication from your provider may help you if you are having nasal congestion.  Getting settled-in. It takes more than one night for most of us to get used to wearing a mask. Try wearing just the mask during a quiet time during the day so your body adapts to wearing it. A humidifier is recommended for comfort in most cases. Our team will work with you carefully on the first  day and will be in contact within 4 days and again at 2 and 4 weeks for advice and remote device adjustments. Your therapy is evaluated by the device each day.   Use it every night. The more you are able to sleep naturally for 7-8 hours, the more likely you will have good sleep and to prevent health risks or symptoms from sleep apnea. Even if you use it 4 hours it helps. Occasionally all of us are unable to use a medical therapy, in sleep apnea, it is not dangerous to miss one night.   Communicate. Call our skilled team on the number provided on the first day if your visit for problems that make it difficult to wear the device. Over 2 out of 3 patients can learn to wear the device long-term with help from our team. Remember to call our team or your sleep providers if you are unable to wear the device as we may have other solutions for those who cannot adapt to mask CPAP therapy. It is recommended that you sleep your sleep provider within the first 3 months and yearly after that if you are not having problems.   Use it for your health. We encourage use of CPAP masks during daytime quiet periods to allow your face and brain to adapt to the sensation of CPAP so that it will be a more natural sensation to awaken to at night or during naps. This can be very useful during the first few weeks or months of adapting to CPAP though it does not help medically to wear CPAP during wakefulness and  should not be used as a strategy just to meet guidelines.  Take care of your equipment. Make sure you clean your mask and tubing using directions every day and that your filter and mask are replaced as recommended or if they are not working.     BESIDES CPAP, WHAT OTHER THERAPIES ARE THERE?    Positioning Device  Positioning devices are generally used when sleep apnea is mild and only occurs on your back.This example shows a pillow that straps around the waist. It may be appropriate for those whose sleep study shows milder sleep apnea  that occurs primarily when lying flat on one's back. Preliminary studies have shown benefit but effectiveness at home may need to be verified by a home sleep test. These devices are generally not covered by medical insurance.  Examples of devices that maintain sleeping on the back to prevent snoring and mild sleep apnea.    Belt type body positioner  http://[employer]/    Electronic reminder  http://nightshifttherapy.com/            Oral Appliance  What is oral appliance therapy?  An oral appliance device fits on your teeth at night like a retainer used after having braces. The device is made by a specialized dentist and requires several visits over 1-2 months before a manufactured device is made to fit your teeth and is adjusted to prevent your sleep apnea. Once an oral device is working properly, snoring should be improved. A home sleep test may be recommended at that time if to determine whether the sleep apnea is adequately treated.       Some things to remember:  -Oral devices are often, but not always, covered by your medical insurance. Be sure to check with your insurance provider.   -If you are referred for oral therapy, you will be given a list of specialized dentists to consider or you may choose to visit the Web site of the American Academy of Dental Sleep Medicine  -Oral devices are less likely to work if you have severe sleep apnea or are extremely overweight.     More detailed information  An oral appliance is a small acrylic device that fits over the upper and lower teeth  (similar to a retainer or a mouth guard). This device slightly moves jaw forward, which moves the base of the tongue forward, opens the airway, improves breathing for effective treat snoring and obstructive sleep apnea in perhaps 7 out of 10 people .  The best working devices are custom-made by a dental device  after a mold is made of the teeth 1, 2, 3.  When is an oral appliance indicated?  Oral appliance therapy is  recommended as a first-line treatment for patients with primary snoring, mild sleep apnea, and for patients with moderate sleep apnea who prefer appliance therapy to use of CPAP4, 5. Severity of sleep apnea is determined by sleep testing and is based on the number of respiratory events per hour of sleep.   How successful is oral appliance therapy?  The success rate of oral appliance therapy in patients with mild sleep apnea is 75-80% while in patients with moderate sleep apnea it is 50-70%. The chance of success in patients with severe sleep apnea is 40-50%. The research also shows that oral appliances have a beneficial effect on the cardiovascular health of SVETLANA patients at the same magnitude as CPAP therapy7.  Oral appliances should be a second-line treatment in cases of severe sleep apnea, but if not completely successful then a combination therapy utilizing CPAP plus oral appliance therapy may be effective. Oral appliances tend to be effective in a broad range of patients although studies show that the patients who have the highest success are females, younger patients, those with milder disease, and less severe obesity. 3, 6.   Finding a dentist that practices dental sleep medicine  Specific training is available through the American Academy of Dental Sleep Medicine for dentists interested in working in the field of sleep. To find a dentist who is educated in the field of sleep and the use of oral appliances, near you, visit the Web site of the American Academy of Dental Sleep Medicine.    References  1. Carlton et al. Objectively measured vs self-reported compliance during oral appliance therapy for sleep-disordered breathing. Chest 2013; 144(5): 7032-2554.  2. Corrina et al. Objective measurement of compliance during oral appliance therapy for sleep-disordered breathing. Thorax 2013; 68(1): 91-96.  3. Kassandra et al. Mandibular advancement devices in 620 men and women with SVETLANA and snoring:  tolerability and predictors of treatment success. Chest 2004; 125: 3593-9024.  4. Lisa et al. Oral appliances for snoring and SVETLANA: a review. Sleep 2006; 29: 244-262.  5. Marcy et al. Oral appliance treatment for SVETLANA: an update. J Clin Sleep Med 2014; 10(2): 215-227.  6. Nighat et al. Predictors of OSAH treatment outcome. J Dent Res 2007; 86: 5562-7384.      Weight Loss:    Weight loss is a long-term strategy that may improve sleep apnea in some patients.    Weight management is a personal decision and the decision should be based on your interest and the potential benefits.  If you are interested in exploring weight loss strategies, the following discussion covers the impact on weight loss on sleep apnea and the approaches that may be successful.    Being overweight does not necessarily mean you will have health consequences.  Those who have BMI over 35 or over 27 with existing medical conditions carries greater risk.   Weight loss decreases severity of sleep apnea in most people with obesity. For those with mild obesity who have developed snoring with weight gain, even 15-30 pound weight loss can improve and occasionally eliminate sleep apnea.  Structured and life-long dietary and health habits are necessary to lose weight and keep healthier weight levels.     Though there may be significant health benefits from weight loss, long-term weight loss is very difficult to achieve- studies show success with dietary management in less than 10% of people. In addition, substantial weight loss may require years of dietary control and may be difficult if patients have severe obesity. In these cases, surgical management may be considered.  Finally, older individuals who have tolerated obesity without health complications may be less likely to benefit from weight loss strategies.      [unfilled]    Surgery:    Surgery for obstructive sleep apnea is considered generally only when other therapies fail to work.  Surgery may be discussed with you if you are having a difficult time tolerating CPAP and or when there is an abnormal structure that requires surgical correction.  Nose and throat surgeries often enlarge the airway to prevent collapse.  Most of these surgeries create pain for 1-2 weeks and up to half of the most common surgeries are not effective throughout life.  You should carefully discuss the benefits and drawbacks to surgery with your sleep provider and surgeon to determine if it is the best solution for you.   More information  Surgery for SVETLANA is directed at areas that are responsible for narrowing or complete obstruction of the airway during sleep.  There are a wide range of procedures available to enlarge and/or stabilize the airway to prevent blockage of breathing in the three major areas where it can occur: the palate, tongue, and nasal regions.  Successful surgical treatment depends on the accurate identification of the factors responsible for obstructive sleep apnea in each person.  A personalized approach is required because there is no single treatment that works well for everyone.  Because of anatomic variation, consultation with an examination by a sleep surgeon is a critical first step in determining what surgical options are best for each patient.  In some cases, examination during sedation may be recommended in order to guide the selection of procedures.  Patients will be counseled about risks and benefits as well as the typical recovery course after surgery. Surgery is typically not a cure for a person s SVETLANA.  However, surgery will often significantly improve one s SVETLANA severity (termed  success rate ).  Even in the absence of a cure, surgery will decrease the cardiovascular risk associated with OSA7; improve overall quality of life8 (sleepiness, functionality, sleep quality, etc).      Palate Procedures:  Patients with SVETLANA often have narrowing of their airway in the region of their tonsils and  uvula.  The goals of palate procedures are to widen the airway in this region as well as to help the tissues resist collapse.  Modern palate procedure techniques focus on tissue conservation and soft tissue rearrangement, rather than tissue removal.  Often the uvula is preserved in this procedure. Residual sleep apnea is common in patient after pharyngoplasty with an average reduction in sleep apnea events of 33%2.      Tongue Procedures:  ExamWhile patients are awake, the muscles that surround the throat are active and keep this region open for breathing. These muscles relax during sleep, allowing the tongue and other structures to collapse and block breathing.  There are several different tongue procedures available.  Selection of a tongue base procedure depends on characteristics seen on physical exam.  Generally, procedures are aimed at removing bulky tissues in this area or preventing the back of the tongue from falling back during sleep.  Success rates for tongue surgery range from 50-62%3.    Hypoglossal Nerve Stimulation:  Hypoglossal nerve stimulation has recently received approval from the [place] Food and Drug Administration for the treatment of obstructive sleep apnea.  This is based on research showing that the system was safe and effective in treating sleep apnea6.  Results showed that the median AHI score decreased 68%, from 29.3 to 9.0. This therapy uses an implant system that senses breathing patterns and delivers mild stimulation to airway muscles, which keeps the airway open during sleep.  The system consists of three fully implanted components: a small generator (similar in size to a pacemaker), a breathing sensor, and a stimulation lead.  Using a small handheld remote, a patient turns the therapy on before bed and off upon awakening.    Candidates for this device must be greater than 18 years of age, have moderate to severe SVETLANA (AHI between 15-65), BMI less than 35, have tried CPAP/oral  appliance for at least 8 weeks without success, and have appropriate upper airway anatomy (determined by a sleep endoscopy performed by Dr. Irwin Monae).    Hypoglossal Nerve Stimulation Pathway:    The sleep surgeon s office will work with the patient through the insurance prior-authorization process (including communications and appeals).    Nasal Procedures:  Nasal obstruction can interfere with nasal breathing during the day and night.  Studies have shown that relief of nasal obstruction can improve the ability of some patients to tolerate positive airway pressure therapy for obstructive sleep apnea1.  Treatment options include medications such as nasal saline, topical corticosteroid and antihistamine sprays, and oral medications such as antihistamines or decongestants. Non-surgical treatments can include external nasal dilators for selected patients. If these are not successful by themselves, surgery can improve the nasal airway either alone or in combination with these other options.      Combination Procedures:  Combination of surgical procedures and other treatments may be recommended, particularly if patients have more than one area of narrowing or persistent positional disease.  The success rate of combination surgery ranges from 66-80%2,3.    References  Perico GARCIA. The Role of the Nose in Snoring and Obstructive Sleep Apnoea: An Update.  Eur Arch Otorhinolaryngol. 2011; 268: 1365-73.   Capelie SM; Scottie JA; Yayo JR; Pallanch JF; Omar MB; Cuco SG; Wali SOOD. Surgical modifications of the upper airway for obstructive sleep apnea in adults: a systematic review and meta-analysis. SLEEP 2010;33(10):7520-9624. Lakeisha FERNÁNDEZ. Hypopharyngeal surgery in obstructive sleep apnea: an evidence-based medicine review.  Arch Otolaryngol Head Neck Surg. 2006 Feb;132(2):206-13.  Ron YH1, Pavan Y, Rolf OROSCO. The efficacy of anatomically based multilevel surgery for obstructive sleep apnea. Otolaryngol Head Neck Surg.  2003 Oct;129(4):327-35.  Kezirian E, Goldberg A. Hypopharyngeal Surgery in Obstructive Sleep Apnea: An Evidence-Based Medicine Review. Arch Otolaryngol Head Neck Surg. 2006 Feb;132(2):206-13.  Chance LÓPEZ et al. Upper-Airway Stimulation for Obstructive Sleep Apnea.  N Engl J Med. 2014 Jan 9;370(2):139-49.  Hemanth Y et al. Increased Incidence of Cardiovascular Disease in Middle-aged Men with Obstructive Sleep Apnea. Am J Respir Crit Care Med; 2002 166: 159-165  Barragan EM et al. Studying Life Effects and Effectiveness of Palatopharyngoplasty (SLEEP) study: Subjective Outcomes of Isolated Uvulopalatopharyngoplasty. Otolaryngol Head Neck Surg. 2011; 144: 623-631.        WHAT IF I ONLY HAVE SNORING?    Mandibular advancement devices, lateral sleep positioning, long-term weight loss and treatment of nasal allergies have been shown to improve snoring.  Exercising tongue muscles with a game (https://[employer]/carlos/soundly-reduce-snoring/bx8308458120) or stimulating the tongue during the day with a device (https://doi.org/10.3390/pcc72923444) have improved snoring in some individuals.    Remember to Drive Safe... Drive Alive     Sleep health profoundly affects your health, mood, and your safety.  Thirty three percent of the population (one in three of us) is not getting enough sleep and many have a sleep disorder. Not getting enough sleep or having an untreated / undertreated sleep condition may make us sleepy without even knowing it. In fact, our driving could be dramatically impaired due to our sleep health. As your provider, here are some things I would like you to know about driving:     Here are some warning signs for impairment and dangerous drowsy driving:              -Having been awake more than 16 hours               -Looking tired               -Eyelid drooping              -Head nodding (it could be too late at this point)              -Driving for more than 30 minutes     Some things you could do to make  the driving safer if you are experiencing some drowsiness:              -Stop driving and rest              -Call for transportation              -Make sure your sleep disorder is adequately treated     Some things that have been shown NOT to work when experiencing drowsiness while driving:              -Turning on the radio              -Opening windows              -Eating any  distracting  /  entertaining  foods (e.g., sunflower seeds, candy, or any other)              -Talking on the phone      Your decision may not only impact your life, but also the life of others. Please, remember to drive safe for yourself and all of us.

## 2023-03-13 NOTE — NURSING NOTE
Is the patient currently in the state of [place]? YES    Visit mode:TELEPHONE    If the visit is dropped, the patient can be reconnected by: TELEPHONE VISIT: Phone number: [phone]    Will anyone else be joining the visit? NO      How would you like to obtain your AVS? MyChart    Are changes needed to the allergy or medication list? NO    Reason for visit: return  Conrad Parekh

## 2023-03-14 ENCOUNTER — TELEPHONE (OUTPATIENT)
Dept: GASTROENTEROLOGY | Facility: CLINIC | Age: 63
End: 2023-03-14
Payer: COMMERCIAL

## 2023-03-14 ENCOUNTER — HOSPITAL ENCOUNTER (OUTPATIENT)
Facility: CLINIC | Age: 63
End: 2023-03-14
Attending: INTERNAL MEDICINE | Admitting: INTERNAL MEDICINE
Payer: COMMERCIAL

## 2023-03-14 DIAGNOSIS — Z12.11 COLON CANCER SCREENING: Primary | ICD-10-CM

## 2023-03-14 NOTE — TELEPHONE ENCOUNTER
Screening Questions  BLUE  KIND OF PREP RED  LOCATION [review exclusion criteria] GREEN  SEDATION TYPE        Y Are you active on mychart?       [facility] Ordering/Referring Provider?        [facility] What type of coverage do you have?      n Have you had a positive covid test in the last 14 days?     29.4 1. BMI  [BMI 40+ - review exclusion criteria]    y  2. Are you able to give consent for your medical care? [IF NO,RN REVIEW]          n  3. Are you taking any prescription pain medications on a routine schedule   (ex narcotics: oxycodone, roxicodone, oxycontin,  and percocet)? [RN Review]        n  3a. EXTENDED PREP What kind of prescription?     n 4. Do you have any chemical dependencies such as alcohol, street drugs, or methadone?        **If yes 3- 5 , please schedule with MAC sedation.**          IF YES TO ANY 6 - 10 - HOSPITAL SETTING ONLY.     n 6.   Do you need assistance transferring?     n 7.   Have you had a heart or lung transplant?    n 8.   Are you currently on dialysis?   n 9.   Do you use daily home oxygen?   n 10. Do you take nitroglycerin?   10a. n If yes, how often?     11. [FEMALES]   Are you currently pregnant?    11a.  If yes, how many weeks? [ Greater than 12 weeks, OR NEEDED]    n 12. Do you have Pulmonary Hypertension? *NEED PAC APPT AT [facility] w/ MAC*     n 13. [review exclusion criteria]  Do you have any implantable devices in your body (pacemaker, defib, LVAD)?    n 14. In the past 6 months, have you had any heart related issues including cardiomyopathy or heart attack?     14a. n If yes, did it require cardiac stenting if so when?     n 15. Have you had a stroke or Transient ischemic attack (TIA - aka  mini stroke ) within 6 months?      n 16. Do you have mod to severe Obstructive Sleep Apnea?  [Hospital only]    n 17. Do you have SEVERE AND UNCONTROLLED asthma? *NEED PAC APPT AT [facility] w/MAC*     18. Are you currently taking any blood thinners?     18a. No. Continue to  "19.   18b. Yes/no Blood Thinner: No [CONTINUE TO #19]    n 19. Do you take the medication Phentermine?    19a. If yes, \"Hold for 7 days before procedure.  Please consult your prescribing provider if you have questions about holding this medication.\"     n  20. Do you have chronic kidney disease?      n  21. Do you have a diagnosis of diabetes?     n  22. On a regular basis do you go 3-5 days between bowel movements?      23. Preferred LOCAL Pharmacy for Pre Prescription    [ LIST ONLY ONE PHARMACY]     [facility] [id number] [address] [address]        - CLOSING REMINDERS -    Informed patient they will need an adult    Cannot take any type of public or medical transportation alone    Conscious Sedation- Needs  for 6 hours after the procedure       MAC/General-Needs  for 24 hours after procedure    Pre-Procedure Covid test to be completed [[facility] PCR Testing Required]    Confirmed Nurse will call to complete assessment       - SCHEDULING DETAILS -   Hospital Setting Required? If yes, what is the exclusion?: abelardo Ramirez  Surgeon   7/25/23  Date of Procedure  Lower Endoscopy [Colonoscopy]  [facility]   STANDARD GOLYTELY-If you answer yes to questions #8, #20, #21Which Colonoscopy Prep was Sent?     moderate Sedation Type     n PAC / Pre-op Required               "

## 2023-03-15 ENCOUNTER — MYC MEDICAL ADVICE (OUTPATIENT)
Dept: SLEEP MEDICINE | Facility: CLINIC | Age: 63
End: 2023-03-15
Payer: COMMERCIAL

## 2023-03-21 ENCOUNTER — TELEPHONE (OUTPATIENT)
Dept: SLEEP MEDICINE | Facility: CLINIC | Age: 63
End: 2023-03-21
Payer: COMMERCIAL

## 2023-03-21 DIAGNOSIS — G47.9 SLEEP DISTURBANCE: Primary | ICD-10-CM

## 2023-03-21 NOTE — TELEPHONE ENCOUNTER
Received following email please review    Insurance Denial Notification     Patient Name:                        Beny Jerome  :                                       1960  MRN:                                       [id number]     Dr. Pascual,     The authorization for procedure PSG DIAGNOSTIC on date of service 2023 has been denied by the patient's insurance for the following reason:     Denial Reason:      An appeal can be filed for possible decision reversal. This can take up to 30 days for the insurance to process once submitted.     Below is the information the [facility] provided to the patient's insurance company. If you wish to proceed with an appeal, please provide additional clinical records and a letter explaining why you feel this service is medically necessary.     Original Clinicals Provided:  Order:                          N/A  Visit Notes:                  SLEEP VV 2023  Results:                       N/A  Other:                          N/A     Please let us know how you wish to proceed as soon as possible.      Thank you,     Gemma Broussard Prior Authorization

## 2023-04-11 NOTE — NURSING NOTE
PSG and follow-up appointment with provider have both been scheduled. PSG has been cancelled currently due to insurance issues. Provider may appeal or change order to HST.  Berenice Carrington, CMA

## 2023-04-18 ENCOUNTER — OFFICE VISIT (OUTPATIENT)
Dept: FAMILY MEDICINE | Facility: CLINIC | Age: 63
End: 2023-04-18
Payer: COMMERCIAL

## 2023-04-18 ENCOUNTER — ANCILLARY PROCEDURE (OUTPATIENT)
Dept: GENERAL RADIOLOGY | Facility: CLINIC | Age: 63
End: 2023-04-18
Attending: NURSE PRACTITIONER
Payer: COMMERCIAL

## 2023-04-18 VITALS
RESPIRATION RATE: 18 BRPM | TEMPERATURE: 97.9 F | HEIGHT: 73 IN | WEIGHT: 221 LBS | SYSTOLIC BLOOD PRESSURE: 132 MMHG | DIASTOLIC BLOOD PRESSURE: 90 MMHG | BODY MASS INDEX: 29.29 KG/M2 | HEART RATE: 84 BPM | OXYGEN SATURATION: 97 %

## 2023-04-18 DIAGNOSIS — M54.50 PAIN IN RIGHT LUMBAR REGION OF BACK: ICD-10-CM

## 2023-04-18 DIAGNOSIS — M54.50 PAIN IN RIGHT LUMBAR REGION OF BACK: Primary | ICD-10-CM

## 2023-04-18 PROCEDURE — 99214 OFFICE O/P EST MOD 30 MIN: CPT | Mod: 25 | Performed by: NURSE PRACTITIONER

## 2023-04-18 PROCEDURE — 72100 X-RAY EXAM L-S SPINE 2/3 VWS: CPT | Mod: TC | Performed by: RADIOLOGY

## 2023-04-18 PROCEDURE — 96372 THER/PROPH/DIAG INJ SC/IM: CPT | Performed by: NURSE PRACTITIONER

## 2023-04-18 RX ORDER — NAPROXEN 500 MG/1
500 TABLET ORAL 2 TIMES DAILY WITH MEALS
Qty: 45 TABLET | Refills: 0 | Status: SHIPPED | OUTPATIENT
Start: 2023-04-18 | End: 2023-06-26

## 2023-04-18 RX ORDER — KETOROLAC TROMETHAMINE 30 MG/ML
30 INJECTION, SOLUTION INTRAMUSCULAR; INTRAVENOUS ONCE
Status: COMPLETED | OUTPATIENT
Start: 2023-04-18 | End: 2023-04-18

## 2023-04-18 RX ORDER — CYCLOBENZAPRINE HCL 10 MG
5-10 TABLET ORAL
Qty: 20 TABLET | Refills: 0 | Status: SHIPPED | OUTPATIENT
Start: 2023-04-18 | End: 2023-06-26

## 2023-04-18 RX ADMIN — KETOROLAC TROMETHAMINE 30 MG: 30 INJECTION, SOLUTION INTRAMUSCULAR; INTRAVENOUS at 11:50

## 2023-04-18 ASSESSMENT — ENCOUNTER SYMPTOMS: BACK PAIN: 1

## 2023-04-18 NOTE — RESULT ENCOUNTER NOTE
Dear Beny,     The radiologist read your x-ray and noted arthritic changes (degenerative) at your disc endplates and facet joints throughout your lower spine.      IMPRESSION: Five lumbar type vertebrae. Normal alignment. No  fractures. Degenerative endplate spurring and facet arthropathy to  varying degrees at all levels of the lumbar spine.     With no improvement or worsening, I recommend further consultation with spine speciality.        Please send a [email or web address] message or call [phone]  if you have any questions.      Viridiana Barton, APRN, CNP  [facility]

## 2023-04-18 NOTE — PATIENT INSTRUCTIONS
Work on stretching of hamstrings, gluteal and hip flexors and trial of cat/cow exercises.

## 2023-04-18 NOTE — PROGRESS NOTES
Assessment & Plan     Beny was seen today for back pain.    Diagnoses and all orders for this visit:    Pain in right lumbar region of back  Acute on chronic lumbar back pain.    Naproxen 500 mg twice daily (take with food) and Flexeril at bedtime.    Consider physical therapy and spine specialty referral with no improvement or worsening.    -     XR Lumbar Spine 2/3 Views; Future - degenerative changes throughout lumbar spine.   -     ketorolac (TORADOL) injection 30 mg  -     cyclobenzaprine (FLEXERIL) 10 MG tablet; Take 0.5-1 tablets (5-10 mg) by mouth nightly as needed for muscle spasms  -     naproxen (NAPROSYN) 500 MG tablet; Take 1 tablet (500 mg) by mouth 2 times daily (with meals)        30 minutes spent by me on the date of the encounter doing chart review, history and exam, documentation and further activities per the note    EN Snell CNP  [facility] [place]        Subjective   Beny is a 63 year old, presenting for the following health issues:  Back Pain        4/18/2023    10:36 AM   Additional Questions   Roomed by Marika Quiroga MA   Accompanied by Self      Back Pain     History of Present Illness       History of intermittent back pain over the years.    Onset of worsening back pain this morning.    Right lumbar region. No numbness/tingling in LE's.    No bowel or bladder incontinence.      Has done muscle relaxants, chiropractor, physical therapy previously.      No known trigger.   Occasionally does some lifting.    Works as a  on [employer] equipment at [employer].      Hasn't missed work for 2 years.    Right hip arthritis, right thigh and shin pain.     Took 1,600 mg of Ibuprofen.     Back Pain:  He presents for follow up of back pain. Patient's back pain is a new problem.    Original cause of back pain: not sure  First noticed back pain: yesterday  Patient feels back pain: constantlyLocation of back pain:  Right lower back and right hip  Description of back  "pain: cramping and sharp  Back pain spreads: right knee and right foot    Since patient first noticed back pain, pain is: always present, but gets better and worse  Does back pain interfere with his job:  Yes  On a scale of 1-10 (10 being the worst), patient describes pain as:  10  What makes back pain worse: bending, coughing, certain positions, lying down, sitting, standing and twisting  Acupuncture: not tried  Acetaminophen: not tried  Activity or exercise: helpful  Chiropractor:  Not helpful  Cold: not tried  Heat: not helpful  Massage: not tried  Muscle relaxants: helpful  NSAIDS: helpful  Opioids: not tried  Physical Therapy: not helpful  Rest: helpful  Steroid Injection: not tried  Stretching: not helpful  Surgery: not tried  TENS unit: helpful  Topical pain relievers: not helpful  Other healthcare providers patient is seeing for back pain: None    He eats 2-3 servings of fruits and vegetables daily.He consumes 2 sweetened beverage(s) daily.He exercises with enough effort to increase his heart rate 20 to 29 minutes per day.  He exercises with enough effort to increase his heart rate 5 days per week.   He is taking medications regularly.    Pain History:    Have you seen anyone else for your pain? Yes - a few years ago- states he's been having trouble with his right hip for the last few months- saw Dr. Ramos in January where he had xray's, it seems like its a pinched nerve,  Sometimes in thigh,shin area and ankle.    How has your pain affected your ability to work? Unable to work due to pain   What type of work do you or did you do?   Where in your body do you have pain? Back Pain      Review of Systems   Musculoskeletal: Positive for back pain.      Constitutional, HEENT, cardiovascular, pulmonary, gi and gu systems are negative, except as otherwise noted.      Objective    BP (!) 132/90   Pulse 84   Temp 97.9  F (36.6  C)   Resp 18   Ht 1.854 m (6' 1\")   Wt 100.2 kg (221 lb)   SpO2 97%   BMI " 29.16 kg/m    Body mass index is 29.16 kg/m .     Physical Exam     GENERAL: healthy, alert and no acute distress, using cane for ambulation and changing position from sitting to standing  MS: lumbar spine is non-tender to palpation, right paraspinal muscles with tightness to palpation, ROM limited due to pain  NEURO: Normal strength and tone in bilateral LE's +5/5, mentation intact and speech normal  PSYCH: mentation appears normal, affect normal/bright

## 2023-05-08 ENCOUNTER — OFFICE VISIT (OUTPATIENT)
Dept: FAMILY MEDICINE | Facility: CLINIC | Age: 63
End: 2023-05-08
Payer: COMMERCIAL

## 2023-05-08 VITALS
HEART RATE: 77 BPM | WEIGHT: 224 LBS | RESPIRATION RATE: 14 BRPM | TEMPERATURE: 97.9 F | DIASTOLIC BLOOD PRESSURE: 80 MMHG | OXYGEN SATURATION: 96 % | BODY MASS INDEX: 29.69 KG/M2 | SYSTOLIC BLOOD PRESSURE: 136 MMHG | HEIGHT: 73 IN

## 2023-05-08 DIAGNOSIS — M54.16 LUMBAR RADICULOPATHY: ICD-10-CM

## 2023-05-08 DIAGNOSIS — M54.50 PAIN IN RIGHT LUMBAR REGION OF BACK: Primary | ICD-10-CM

## 2023-05-08 DIAGNOSIS — M67.432 GANGLION CYST OF WRIST, LEFT: ICD-10-CM

## 2023-05-08 DIAGNOSIS — M16.11 ARTHRITIS OF RIGHT HIP: ICD-10-CM

## 2023-05-08 DIAGNOSIS — R10.9 RIGHT FLANK PAIN: ICD-10-CM

## 2023-05-08 DIAGNOSIS — Z12.11 SCREEN FOR COLON CANCER: ICD-10-CM

## 2023-05-08 PROCEDURE — 99213 OFFICE O/P EST LOW 20 MIN: CPT | Performed by: FAMILY MEDICINE

## 2023-05-08 ASSESSMENT — PAIN SCALES - GENERAL: PAINLEVEL: NO PAIN (1)

## 2023-05-08 NOTE — PROGRESS NOTES
Assessment & Plan   Pain in right lumbar region of back  Lumbar radiculopathy  Uncontrolled.  We reviewed and discussed the results of 4/18/2023 XR of lumbar spine, which revealed degenerative disc disease and arthritis  facet joint  We discussed symptoms compared to arthritic symptoms, along with testing and treatment options, including, physical therapy. He agreed to proceed with physical therapy.  Okay to use cyclobenzaprine and naproxen as needed.  - Physical Therapy Referral    Arthritis of right hip  Uncontrolled.  We discussed again the 1/30/2023 XR of right hip, which revealed arthritis of the right hip joint and right SI joint.  We discussed differences in arthritic symptoms versus possible nerve pain issues pertaining to lumbar pain, along with testing and treatment options, including, MRI, physical therapy, orthopedic referral versus neurosurgery referral, steroid injections. He agreed to proceed with physical therapy and an orthopedic referral to address his hip arthritis which may be a bigger issue than his low back..    - Orthopedic  Referral  - Physical Therapy Referral    Screen for colon cancer  He is scheduled for a screening colonoscopy 7/10/2023.    Right flank pain  Stable, no new symptoms.  Most recent labs from 1/30/2023 were all within normal limits.  He does not report hematuria.  Symptom not concern at this time, and may possibly resolve with treatment for hip and back pain.  He will continue to monitor at home.    Ganglion cyst of wrist, left  Stable, new symptoms, no radicular symptoms or impingement on movement.  He will continue to monitor at home and if symptoms worsen consider referral to hand surgery.      Return in about 1-2 months (around 7/8/2023) for recheck. Call the office if symptoms worsen or if new symptoms develop.       Scribe Disclosure:   I, Mavis Tripp, am serving as a scribe to document services personally performed by Nhan Ramos MD based on data  collection and the provider's statements to me.     Provider Disclosure:  I agree with above History, Review of Systems, Physical exam and Plan.  I have reviewed the content of the documentation and have edited it as needed. I have personally performed the services documented here and the documentation accurately represents those services and the decisions I have made.      Electronically signed by:        Anand Ramos MD      [address] [address]  [place]   Office: [phone]       Kt Swan is a 63 year old, presenting for the following health issues:  Musculoskeletal Problem        5/8/2023     6:56 AM   Additional Questions   Roomed by LETICIA Hernandez   Accompanied by         Musculoskeletal Problem    History of Present Illness       Reason for visit:  Hip-abdomen pain-back-wrist    He was evaluated by EN Molina, CNP on 4/18/2023 for chronic low back pain. She prescribed cyclobenzaprine HCl 10mg, which he only took for 3 days, and naproxen 500mg, which he took for 4-5 days and then only twice since then because he knows it should not be a long-term solution due to possible develop of GI issues. His hip pain is mostly continuous but slightly improved with the aforementioned medications. He is still experiencing spasm in his right lower back, which he has experienced for years. He had a pinched nerve in his neck years ago, and says the pain is different. When his hip and back pain increases, the pain radiates down his right leg to his shin. Weakness in his right leg/foot is felt when he is working and he states it it only intermittently painful. He has no previous injury to his back. He had an MRI years ago that revealed herniated discs in the thoracic and lumbar regions. At that time, the doctor told him that surgery would not be done unless he could not walk. He completed physical therapy, which had alleviated the symptoms. However,  "he can no longer perform home exercises due to current hip and back pain.    He has been experiencing pain on his right flank towards his back that is waxing and waning but sometimes severe. He has heartburn, but it is not a major issue. He had kidney stones during childhood but they have not reoccurred. He does not have hematuria.     He has a cyst on his left wrist which bothers him only if he applies pressure and he would like to discuss treatment options.      He eats 2-3 servings of fruits and vegetables daily.He consumes 1 sweetened beverage(s) daily.He exercises with enough effort to increase his heart rate 9 or less minutes per day.  He exercises with enough effort to increase his heart rate 3 or less days per week.   He is taking medications regularly.      Review of Systems           Objective    /80   Pulse 77   Temp 97.9  F (36.6  C) (Tympanic)   Resp 14   Ht 1.854 m (6' 1\")   Wt 101.6 kg (224 lb)   SpO2 96%   BMI 29.55 kg/m    Body mass index is 29.55 kg/m .  Physical Exam   GENERAL: no acute distress  CARDIAC: regular rate and rhythm, no murmur  RESP: clear, no wheezing, no rales, no rhonchi  ABD: soft, no distension, tenderness in right upper quadrant/flank  SKIN: No rashes  MUSC: right paralumbar muscles tenderness to palpation; rotation, negative bilateral straight leg test, reduce flexion range of motion normal on left side; limited flexion range of motion of right hip; pain with internal and external rotation of right hip; mild reduced reflex of right knee; bilateral ankle reflexes normal; strength test 5/5             "

## 2023-05-15 ENCOUNTER — THERAPY VISIT (OUTPATIENT)
Dept: PHYSICAL THERAPY | Facility: CLINIC | Age: 63
End: 2023-05-15
Attending: FAMILY MEDICINE
Payer: COMMERCIAL

## 2023-05-15 DIAGNOSIS — M54.16 LUMBAR RADICULOPATHY: ICD-10-CM

## 2023-05-15 DIAGNOSIS — M54.50 PAIN IN RIGHT LUMBAR REGION OF BACK: ICD-10-CM

## 2023-05-15 DIAGNOSIS — M16.11 ARTHRITIS OF RIGHT HIP: ICD-10-CM

## 2023-05-15 PROCEDURE — 97161 PT EVAL LOW COMPLEX 20 MIN: CPT | Mod: GP | Performed by: PHYSICAL THERAPIST

## 2023-05-15 PROCEDURE — 97110 THERAPEUTIC EXERCISES: CPT | Mod: GP | Performed by: PHYSICAL THERAPIST

## 2023-05-15 NOTE — PROGRESS NOTES
Answers for HPI/ROS submitted by the patient on 5/14/2023  Reason for Visit:: Right hip, lower back  How problem occurred:: No idea.  Number scale: 6/10  General health as reported by patient: good  Medical allergies: none  Surgeries: orthopedic surgery, other  Other Surgery Detail: Appendix, gall bladder, lipoma, neck,  Medications you are currently taking: cardiac medication  What are your primary job tasks: lifting/carrying, prolonged standing, pushing/pulling, repetitive tasks, other  Other Tasks Detail: I am a [employer]   [facility] Initial [facility]     Present: no    Subjective:  Beny Jerome is a 63 year old male with complaints of lumbar/right hip . Pt reports that this past weekend his back and hip are flared up without known cause. Pain in the lower lumbar worse with standing, walking, various positions. Denies vague symptoms. Wants to get rid of this pain and return to PLOF without issue. Also having right hip pain that is getting worse overall with xray with OA.     Symptoms commenced as a result of: unsure. Condition occurred in the following environment: unsure. Onset of symptoms: acute on chronic. Location of symptoms: lower lumbar central. Pain level on number scale: 6/10. Quality of pain: aching, sharp. Associated symptoms: none currently at times pain in the front of the right thigh into the right shin. Pain frequency (constant/intermittent): constant. Symptoms are exacerbated by: standing, walking, lifting, certain positions. Symptoms are relieved by: keeping moving, pain meds. Progression of symptoms since onset: worse today. Imaging: Xray lumbar/right hip OA, see Epic for full report. Previous treatment: PT. Response to previous treatment: yes. General health as reported by patient is good. Pertinent medical history includes: See Epic. Medical allergies: see Epic. Other pertinent surgeries: see Epic. Current medications: See Epic. Occupation: [employer]  . Work/restriction status: none. Primary job tasks: lifting, carrying, standing, pushing, pulling. Barriers at home/work: None reported by patient. Red flags: None reported by patient.    Objective  Posture: forward head, rounded shoulders    Gait: unremarkable    Screening: + FADDIR R hip    Flexibility: hamstrings tight tatianna    Lumbar Movement Loss Response   Flexion To upper shin back pain and with repeated motion about the same overall   Extension Moderately limited pain in the back and with repeated motion slightly worse overall    Side bending/glide L Full    Side bending/glide R Near full right hip pain   Rotation L Near full   Rotation R Near full   Quadrants (if applicable)      Hip PROM/Strength: ROM Left hip WFL all planes pain free. R hip flexion 95*, 25*, IR 5*, Hip Abduction L 4/5 R 3+/5*    Neurological:    Myotomes L R   L1-2 (hip flexion) 5/5 5/5   L3 (knee extension) 5/5 5/5   L4 (ankle DF) 5/5 5/5   L5 (g. toe ext) 5/5 5/5   S1 (ankle PF or knee flex) 5/5 5/5     Dural Signs L R   Slump - -   SLR - -     Palpation: right lumbar paraspinals    Prone Assessment: prone lying no pain, ASHTYN very poor motion with sharp lumbar pain, Pressups unable to perform due to pain, limitation    Accessory Motion: T12-L1 tender no change with repeated    Functional Squat and Balance: fair squat, fair SLS tatianna    Other Tests: none    Key Findings  Acute on chronic lumbar and right hip pain, will benefit from skilled PT to reduce pain and improve overall function  Assessment/Plan:    Patient is a 63 year old male with lumbar and right side hip complaints.    Patient has the following significant findings with corresponding treatment plan.                Diagnosis 1:  Chronic lumbar pain right, Chronic right hip pain  Pain -  self management, education, directional preference exercise and home program  Decreased ROM/flexibility - manual therapy and therapeutic exercise  Decreased joint mobility - manual  therapy and therapeutic exercise  Decreased strength - therapeutic exercise and therapeutic activities  Impaired muscle performance - neuro re-education  Decreased function - therapeutic activities  Impaired posture - neuro re-education    Therapy Evaluation Codes:     1) History comprised of:   Personal factors that impact the plan of care:      Time since onset of symptoms.    Comorbidity factors that impact the plan of care are:      Heart problems.     Medications impacting care: Cardiac.  2) Examination of Body Systems comprised of:   Body structures and functions that impact the plan of care:      Hip and Lumbar spine.   Activity limitations that impact the plan of care are:      Driving, Dressing, Lifting, Sitting, Squatting/kneeling, Stairs, Standing, Walking, Working, Sleeping and Laying down.  3) Clinical presentation characteristics are:   Stable/Uncomplicated.  4) Decision-Making    Low complexity using standardized patient assessment instrument and/or measureable assessment of functional outcome.    Cumulative Therapy Evaluation is: Low complexity.    Previous and current functional limitations:  (See Goal Flow Sheet for this information)    Short term and Long term goals: (See Goal Flow Sheet for this information)     Communication ability:  Patient appears to be able to clearly communicate and understand verbal and written communication and follow directions correctly.  Treatment Explanation - The following has been discussed with the patient:   RX ordered/plan of care  Anticipated outcomes  Possible risks and side effects  This patient would benefit from PT intervention to resume normal activities.   Rehab potential is good.    Frequency:  1 X week, once daily  Duration:  for 10 weeks  Discharge Plan:  Achieve all LTG.  Independent in home treatment program.  Reach maximal therapeutic benefit.    Please refer to the daily flowsheet for treatment today, total treatment time and time spent performing  1:1 timed codes.     Inquires  Bang Laguerre PT, DPT, CSCS  Physical Therapist  [facility]  [id number] [place] [address], [address] [address]  [phone]

## 2023-05-17 ENCOUNTER — DOCUMENTATION ONLY (OUTPATIENT)
Dept: SLEEP MEDICINE | Facility: CLINIC | Age: 63
End: 2023-05-17

## 2023-05-17 ENCOUNTER — OFFICE VISIT (OUTPATIENT)
Dept: SLEEP MEDICINE | Facility: CLINIC | Age: 63
End: 2023-05-17
Payer: COMMERCIAL

## 2023-05-17 DIAGNOSIS — G47.9 SLEEP DISTURBANCE: ICD-10-CM

## 2023-05-17 DIAGNOSIS — G47.33 OSA (OBSTRUCTIVE SLEEP APNEA): Primary | ICD-10-CM

## 2023-05-17 PROCEDURE — G0399 HOME SLEEP TEST/TYPE 3 PORTA: HCPCS | Mod: 52 | Performed by: INTERNAL MEDICINE

## 2023-05-17 ASSESSMENT — SLEEP AND FATIGUE QUESTIONNAIRES
HOW LIKELY ARE YOU TO NOD OFF OR FALL ASLEEP WHILE SITTING INACTIVE IN A PUBLIC PLACE: HIGH CHANCE OF DOZING
HOW LIKELY ARE YOU TO NOD OFF OR FALL ASLEEP WHILE SITTING AND READING: MODERATE CHANCE OF DOZING
HOW LIKELY ARE YOU TO NOD OFF OR FALL ASLEEP WHILE LYING DOWN TO REST IN THE AFTERNOON WHEN CIRCUMSTANCES PERMIT: HIGH CHANCE OF DOZING
HOW LIKELY ARE YOU TO NOD OFF OR FALL ASLEEP WHILE WATCHING TV: HIGH CHANCE OF DOZING
HOW LIKELY ARE YOU TO NOD OFF OR FALL ASLEEP IN A CAR, WHILE STOPPED FOR A FEW MINUTES IN TRAFFIC: MODERATE CHANCE OF DOZING
HOW LIKELY ARE YOU TO NOD OFF OR FALL ASLEEP WHILE SITTING QUIETLY AFTER LUNCH WITHOUT ALCOHOL: HIGH CHANCE OF DOZING
HOW LIKELY ARE YOU TO NOD OFF OR FALL ASLEEP WHILE SITTING AND TALKING TO SOMEONE: WOULD NEVER DOZE
HOW LIKELY ARE YOU TO NOD OFF OR FALL ASLEEP WHEN YOU ARE A PASSENGER IN A CAR FOR AN HOUR WITHOUT A BREAK: SLIGHT CHANCE OF DOZING

## 2023-05-17 NOTE — PROGRESS NOTES
Pt is completing a home sleep test. Pt was instructed on how to put on the Noxturnal T3 device and associated equipment before going to bed and given the opportunity to practice putting it on before leaving the sleep center. Pt was reminded to bring the home sleep test kit back to the center tomorrow, at agreed upon time for download and reporting.   Neck circumference: 43 CM / 17 inches.

## 2023-05-18 ENCOUNTER — DOCUMENTATION ONLY (OUTPATIENT)
Dept: SLEEP MEDICINE | Facility: CLINIC | Age: 63
End: 2023-05-18
Payer: COMMERCIAL

## 2023-05-18 NOTE — PROGRESS NOTES
HST POST-STUDY QUESTIONNAIRE    1. What time did you go to bed?  9:30pm  2. How long do you think it took to fall asleep?  1 hour+  3. What time did you wake up to start the day?  5:55am  4. Did you get up during the night at all?  Yes  5. If you woke up, do you remember approximately what time(s)? I don't remember.   6. Did you have any difficulty with the equipment?  No  7. Did you us any type of treatment with this study?  None  8. Was the head of the bed elevated? No  9. Did you sleep in a recliner?  No  10. Did you stop using CPAP at least 3 days before this test?  NA  11. Any other information you'd like us to know?

## 2023-05-19 NOTE — PROGRESS NOTES
This HSAT was performed using a Noxturnal T3 device which recorded snore, sound, movement activity, body position, nasal pressure, oronasal thermal airflow, pulse, oximetry and both chest and abdominal respiratory effort. HSAT data was restricted to the time patient states they were in bed.     HSAT was scored using 1B 4% hypopnea rule.     HST AHI (Non-PAT): 42.8  Snoring was reported as mild and moderate.  Time with SpO2 below 89% was 43.1 minutes.   Overall signal quality was good.     Pt will follow up with sleep provider to determine appropriate therapy.

## 2023-05-27 NOTE — PROCEDURES
"HOME SLEEP STUDY INTERPRETATION        Patient: Beny Jerome  MRN: [id number]  YOB: 1960  Study Date: 2023  PCP/Referring Provider: Nhan Ramos  Ordering Provider: Vignesh Pascual MD    Indications for Home Study: Beny Jerome is a 63 year old male with symptoms suggestive of obstructive sleep apnea.    Estimated body mass index is 29.55 kg/m  as calculated from the following:    Height as of 23: 1.854 m (6' 1\").    Weight as of 23: 101.6 kg (224 lb).  Total score - Gaston: 17 (2023 11:10 AM)  STOP-BAN/8      Data: A full night home sleep study was performed recording the standard physiologic parameters including body position, movement, sound, nasal pressure, thermal oral airflow, chest and abdominal movements with respiratory inductance plethysmography, and oxygen saturation by pulse oximetry. Pulse rate was estimated by oximetry recording. This study was considered adequate based on > 4 hours of quality oximetry and respiratory recording. As specified by the AASM Manual for the Scoring of Sleep and Associated events, version 2.3, Rule VIII.D 1B, 4% oxygen desaturation scoring for hypopneas is used as a standard of care on all home sleep apnea testing.      Analysis Time: 339.5 minutes    Respiration:   Sleep Associated Hypoxemia: sustained hypoxemia was present.  Average oxygen saturation was 91.1%.  Time with saturation less than or equal to 88% was 43.1 minutes. The lowest oxygen saturation was 72%.   Snoring: Snoring was present.  Respiratory events: The home study revealed a presence of 72 obstructive apneas and 93 mixed and central apneas. There were 75 hypopneas resulting in a combined apnea/hypopnea index [AHI] of 42.8 events per hour.  AHI was 68.9 per hour supine, n/a per hour prone, 5.6 per hour on left side, and n/a  on right side.   Pattern: Excluding events noted above, respiratory rate was normal and there was periodicity in the " breathing pattern pattern without prolonged circulation time and  cycle length less than 40 seconds, not consistent with Cheyne-Aden respiration.    Position: Percent of time spent: supine -58.2%, prone -0%, on left -40.9%, on right -0%.      Heart Rate: By pulse oximetry, average pulse rate was normal at 76 bpm.  The minimum pulse rate was 62 bpm and the maximum pulse rate was 103 bpm.    Assessment:     Severe sleep apnea was present with both obstructive and central apneas, predominantly obstructive apneas, pronounced during supine sleep position.    Sleep associated hypoxemia was present.    Recommendations:    Consider the following options: a) obtaining in-lab sleep study with titration using CPAP device to establish the optimum treatment to control the sleep disordered breathing or b) initiating auto titrating CPAP treatment with clinical follow-up and review of compliance measures and close monitoring for central apneas or c) combination of positional therapy during sleep using devices such as FDA approved zzoma pillow and oral appliance through referral to sleep dentistry.    Suggest optimizing sleep hygiene and avoiding sleep deprivation.    Weight management.      Diagnosis Code(s): Obstructive Sleep Apnea G47.33, Hypoxemia G47.36, Snoring R06.83, Central Sleep Apnea G47.31    Vignesh Pascual MD, May 27, 2023   Diplomate, American Board of Internal Medicine, Sleep Medicine

## 2023-06-09 RX ORDER — BISACODYL 5 MG/1
TABLET, DELAYED RELEASE ORAL
Qty: 4 TABLET | Refills: 0 | Status: SHIPPED | OUTPATIENT
Start: 2023-06-09 | End: 2023-07-09 | Stop reason: HOSPADM

## 2023-06-26 ENCOUNTER — OFFICE VISIT (OUTPATIENT)
Dept: SLEEP MEDICINE | Facility: CLINIC | Age: 63
End: 2023-06-26
Payer: COMMERCIAL

## 2023-06-26 ENCOUNTER — APPOINTMENT (OUTPATIENT)
Dept: SLEEP MEDICINE | Facility: CLINIC | Age: 63
End: 2023-06-26
Payer: COMMERCIAL

## 2023-06-26 VITALS
WEIGHT: 218 LBS | HEIGHT: 74 IN | OXYGEN SATURATION: 95 % | SYSTOLIC BLOOD PRESSURE: 146 MMHG | DIASTOLIC BLOOD PRESSURE: 88 MMHG | HEART RATE: 70 BPM | BODY MASS INDEX: 27.98 KG/M2

## 2023-06-26 DIAGNOSIS — G47.36 HYPOXEMIA ASSOCIATED WITH SLEEP: ICD-10-CM

## 2023-06-26 DIAGNOSIS — G47.33 OSA ON CPAP: Primary | ICD-10-CM

## 2023-06-26 PROCEDURE — 99213 OFFICE O/P EST LOW 20 MIN: CPT | Performed by: INTERNAL MEDICINE

## 2023-06-26 ASSESSMENT — SLEEP AND FATIGUE QUESTIONNAIRES
HOW LIKELY ARE YOU TO NOD OFF OR FALL ASLEEP WHEN YOU ARE A PASSENGER IN A CAR FOR AN HOUR WITHOUT A BREAK: SLIGHT CHANCE OF DOZING
HOW LIKELY ARE YOU TO NOD OFF OR FALL ASLEEP WHILE WATCHING TV: HIGH CHANCE OF DOZING
HOW LIKELY ARE YOU TO NOD OFF OR FALL ASLEEP WHILE SITTING QUIETLY AFTER LUNCH WITHOUT ALCOHOL: HIGH CHANCE OF DOZING
HOW LIKELY ARE YOU TO NOD OFF OR FALL ASLEEP WHILE SITTING AND READING: HIGH CHANCE OF DOZING
HOW LIKELY ARE YOU TO NOD OFF OR FALL ASLEEP IN A CAR, WHILE STOPPED FOR A FEW MINUTES IN TRAFFIC: SLIGHT CHANCE OF DOZING
HOW LIKELY ARE YOU TO NOD OFF OR FALL ASLEEP WHILE SITTING INACTIVE IN A PUBLIC PLACE: MODERATE CHANCE OF DOZING
HOW LIKELY ARE YOU TO NOD OFF OR FALL ASLEEP WHILE SITTING AND TALKING TO SOMEONE: SLIGHT CHANCE OF DOZING
HOW LIKELY ARE YOU TO NOD OFF OR FALL ASLEEP WHILE LYING DOWN TO REST IN THE AFTERNOON WHEN CIRCUMSTANCES PERMIT: HIGH CHANCE OF DOZING

## 2023-06-26 NOTE — PROGRESS NOTES
[facility]  Sleep clinic follow-up visit note   Date: 6/26/23     Chief complaint: Review results of the home sleep study     Beny Jerome is a 63 year old male who previously presented to sleep clinic with symptoms suggestive of obstructive sleep apnea.  Home sleep study  was obtained on  5/17/23 to evaluate for SVETLANA.  Patient presents to sleep clinic today to review the test results and to discuss plan of care.     Home sleep study report:  Study date: 5/17/23     Analysis Time: 339.5 minutes    Respiration:   Sleep Associated Hypoxemia: sustained hypoxemia was present.  Average oxygen saturation was 91.1%.  Time with saturation less than or equal to 88% was 43.1 minutes. The lowest oxygen saturation was 72%.   Snoring: Snoring was present.  Respiratory events: The home study revealed a presence of 72 obstructive apneas and 93 mixed and central apneas. There were 75 hypopneas resulting in a combined apnea/hypopnea index [AHI] of 42.8 events per hour.  AHI was 68.9 per hour supine, n/a per hour prone, 5.6 per hour on left side, and n/a  on right side.   Pattern: Excluding events noted above, respiratory rate was normal and there was periodicity in the breathing pattern pattern without prolonged circulation time and  cycle length less than 40 seconds, not consistent with Cheyne-Aden respiration.     Position: Percent of time spent: supine -58.2%, prone -0%, on left -40.9%, on right -0%.        Heart Rate: By pulse oximetry, average pulse rate was normal at 76 bpm.  The minimum pulse rate was 62 bpm and the maximum pulse rate was 103 bpm.     Assessment:     Severe sleep apnea was present with both obstructive and central apneas, predominantly obstructive apneas, pronounced during supine sleep position.    Sleep associated hypoxemia was present.      Test results were discussed with the patient in detail.        Past medical/surgical history, family history, social history, medications and allergies were  reviewed.               Physical Examination:   General: Pleasant. Cooperative. In no apparent distress.  Pulmonary: Able to speak in full sentences easily. No cough or wheeze.   Neurologic: Alert, oriented x3.  Psychiatric: Mood euthymic. Affect congruent with full range and intensity.     ASSESSMENT/PLAN:  Obstructive sleep apnea: We discussed the results of the home sleep study with the patient in detail.  We discussed the consequences of untreated sleep apnea.  We also discussed the treatment options for SVETLANA. Patient was interested in CPAP treatment.  DME orders were previously generated for auto CPAP 5 to 15 cm water.  He is scheduled to get the CPAP set up through [facility] this afternoon.  After obtaining CPAP equipment, patient was recommended to use the device regularly during sleep and was instructed to get back to us if any concerns with the device use.  We discussed the mask exchange policy and the compliance goals.  Patient  will be followed through sleep therapy management program.     Plan is to follow-up via video visit in approximately 8-10 weeks to review CPAP compliance measures.     Sleep associated hypoxemia: Recommended obtaining an overnight oximetry with the auto CPAP device in approximately 1 month after initiating the CPAP therapy. Orders for future overnight oximetry generated in epic. Plan is to communicate the results of the the overnight oximetry test results via hulu.      Overewight: We discussed weight management with healthy diet, and exercise.        Patient was strongly advised to avoid driving, operating any heavy machinery or other hazardous situations while drowsy or sleepy.  Patient was counseled on the importance of driving while alert, to pull over if drowsy, or nap before getting into the vehicle if sleepy.         The above note was dictated using voice recognition software. Although reviewed after completion, some word and grammatical error may remain .  "Please contact the author for any clarifications.      \" Total time spent was 22 minutes  for this appointment on this date of service which include time spent before, during and after the visit for chart review, patient care, counseling and coordination of care. Including documentation\"       Vignesh Pascual MD  [facility]  [address] , [place] [address]      "

## 2023-06-26 NOTE — PROGRESS NOTES
Patient was offered choice of vendor and chose [facility].  Patient Beny Calderont was set up at [place] on June 26, 2023. Patient received a Resmed Airsense 11 Pressures were set at 5-15 cm H2O.   Patient s ramp is 5 cm H2O for Auto and FLEX/EPR is EPR, 2.  Patient received a Resmed Mask name: Airtouch F20  Full Face mask size Medium, heated tubing and heated humidifier.  Patient has the following compliance requirements: none  Patient has a follow up on TBD with Dr. Pascual.    Gege Noel

## 2023-06-26 NOTE — NURSING NOTE
"Chief Complaint   Patient presents with     Sleep Problem     Sleep study results        Initial BP (!) 146/88   Pulse 70   Ht 1.867 m (6' 1.5\")   Wt 98.9 kg (218 lb)   SpO2 95%   BMI 28.37 kg/m   Estimated body mass index is 28.37 kg/m  as calculated from the following:    Height as of this encounter: 1.867 m (6' 1.5\").    Weight as of this encounter: 98.9 kg (218 lb).    Medication Reconciliation: complete  ESS 17  DIANNE 21  Elvira Avery MA    "

## 2023-06-29 ENCOUNTER — DOCUMENTATION ONLY (OUTPATIENT)
Dept: SLEEP MEDICINE | Facility: CLINIC | Age: 63
End: 2023-06-29
Payer: COMMERCIAL

## 2023-06-29 NOTE — PROGRESS NOTES
3 day Sleep therapy management telephone visit    Diagnostic AHI:  42.8 HST    Confirmed with patient at time of call- N/A Patient is still interested in STM service       Message left for patient to return call    Order settings:  CPAP MIN CPAP MAX   5 cm H2O 15 cm H2O       Device settings:  CPAP MIN CPAP MAX EPR RESMED SOFT RESPONSE SETTING   5 cm  H20 15 cm  H20 2 OFF       Compliance 100 %    Assessment: Nightly usage over four hours      Action plan: Patient to have 14 day STM visit. Patient has a follow up visit scheduled:   yes within 31-90 days of set up    Replacement device: No  STM ordered by provider: Yes     Total time spent on accessing and  interpreting remote patient PAP therapy data  10 minutes    Total time spent counseling, coaching  and reviewing PAP therapy data with patient  1 minutes    [id number] no

## 2023-06-30 ENCOUNTER — DOCUMENTATION ONLY (OUTPATIENT)
Dept: SLEEP MEDICINE | Facility: CLINIC | Age: 63
End: 2023-06-30
Payer: COMMERCIAL

## 2023-06-30 NOTE — PROGRESS NOTES
[facility] DME called and notified of ADELIA orders per Dr. Sadler. [facility] stated understanding and would begin process right away.

## 2023-06-30 NOTE — PROGRESS NOTES
Pt called back and reports CPAP is going ok except for his mask leaks a lot and last night after he got back to bed after usng the bathroom and put the mask back on he felt out of breath. Pts ramp was set to EPR only. Changed it it full time. Turned off ramp. Advised pt to contact [facility] for mask assistance.

## 2023-07-09 NOTE — PROGRESS NOTES
Attempted to call patient twice [phone] and [phone] regarding the need to reschedule his colonoscopy scheduled 7/10/23 at 8:00. Unable to reach patient, second voicemail stated procedure to be cancelled. Phone number given for patient to call and reschedule case when ready.    Pre-procedure checklist reviewed and pre-sedation note complete.    MD aware of maximum contrast dose of 55.5 mL.

## 2023-07-12 ENCOUNTER — APPOINTMENT (OUTPATIENT)
Dept: SLEEP MEDICINE | Facility: CLINIC | Age: 63
End: 2023-07-12
Payer: COMMERCIAL

## 2023-07-13 ENCOUNTER — TELEPHONE (OUTPATIENT)
Dept: SLEEP MEDICINE | Facility: CLINIC | Age: 63
End: 2023-07-13

## 2023-07-13 DIAGNOSIS — G47.33 OSA ON CPAP: ICD-10-CM

## 2023-07-13 NOTE — TELEPHONE ENCOUNTER
Oximetry has been resulted and  scanned into chart.  Encounter forwarded to provider for review.    Recording Start Date: 7/6/2023    Completed on: 7/7/2023    Duration: 9  Hrs: 26 Minutes: 12 Seconds   Time (min) Spent below 88%: 12.2 min      ANYA Good

## 2023-07-20 RX ORDER — BISACODYL 5 MG/1
TABLET, DELAYED RELEASE ORAL
Qty: 4 TABLET | Refills: 0 | Status: SHIPPED | OUTPATIENT
Start: 2023-07-20 | End: 2024-10-04

## 2023-07-21 ENCOUNTER — TELEPHONE (OUTPATIENT)
Dept: FAMILY MEDICINE | Facility: CLINIC | Age: 63
End: 2023-07-21
Payer: COMMERCIAL

## 2023-07-21 DIAGNOSIS — M16.11 ARTHRITIS OF RIGHT HIP: Primary | ICD-10-CM

## 2023-07-21 NOTE — TELEPHONE ENCOUNTER
Forms/Letter Request    Type of form/letter: [place] [employer]  and [employer] Services Application for Disability Parking Certificate    Have you been seen for this request: N/A    Do we have the form/letter: Yes: Provider's Bin    Who is the form from? Patient    Where did/will the form come from? Patient or family brought in       When is form/letter needed by: When Dr. Ramos is able to complete it    How would you like the form/letter returned:     Patient Notified form requests are processed in 3-5 business days:Yes    Could we send this information to you in [employer] or would you prefer to receive a phone call?:   Patient would prefer a phone call   Okay to leave a detailed message?: Yes at Cell number on file:    Telephone Information:   Mobile [phone]

## 2023-07-25 ENCOUNTER — HOSPITAL ENCOUNTER (OUTPATIENT)
Facility: CLINIC | Age: 63
Discharge: HOME | End: 2023-07-25
Attending: SURGERY | Admitting: SURGERY
Payer: COMMERCIAL

## 2023-07-25 VITALS
SYSTOLIC BLOOD PRESSURE: 123 MMHG | OXYGEN SATURATION: 96 % | DIASTOLIC BLOOD PRESSURE: 72 MMHG | RESPIRATION RATE: 8 BRPM | HEART RATE: 81 BPM

## 2023-07-25 DIAGNOSIS — Z12.11 ENCOUNTER FOR SCREENING COLONOSCOPY: Primary | ICD-10-CM

## 2023-07-25 LAB — COLONOSCOPY: NORMAL

## 2023-07-25 PROCEDURE — 88305 TISSUE EXAM BY PATHOLOGIST: CPT | Mod: TC | Performed by: SURGERY

## 2023-07-25 PROCEDURE — 88305 TISSUE EXAM BY PATHOLOGIST: CPT | Mod: 26 | Performed by: PATHOLOGY

## 2023-07-25 PROCEDURE — 250N000011 HC RX IP 250 OP 636: Performed by: SURGERY

## 2023-07-25 PROCEDURE — 45380 COLONOSCOPY AND BIOPSY: CPT | Mod: PT | Performed by: SURGERY

## 2023-07-25 PROCEDURE — 99153 MOD SED SAME PHYS/QHP EA: CPT | Performed by: SURGERY

## 2023-07-25 PROCEDURE — G0500 MOD SEDAT ENDO SERVICE >5YRS: HCPCS | Performed by: SURGERY

## 2023-07-25 RX ORDER — NALOXONE HYDROCHLORIDE 0.4 MG/ML
0.2 INJECTION, SOLUTION INTRAMUSCULAR; INTRAVENOUS; SUBCUTANEOUS
Status: CANCELLED | OUTPATIENT
Start: 2023-07-25

## 2023-07-25 RX ORDER — NALOXONE HYDROCHLORIDE 0.4 MG/ML
0.4 INJECTION, SOLUTION INTRAMUSCULAR; INTRAVENOUS; SUBCUTANEOUS
Status: CANCELLED | OUTPATIENT
Start: 2023-07-25

## 2023-07-25 RX ORDER — FLUMAZENIL 0.1 MG/ML
0.2 INJECTION, SOLUTION INTRAVENOUS
Status: CANCELLED | OUTPATIENT
Start: 2023-07-25 | End: 2023-07-26

## 2023-07-25 RX ORDER — FENTANYL CITRATE 50 UG/ML
INJECTION, SOLUTION INTRAMUSCULAR; INTRAVENOUS PRN
Status: DISCONTINUED | OUTPATIENT
Start: 2023-07-25 | End: 2023-07-25 | Stop reason: HOSPADM

## 2023-07-25 RX ORDER — ONDANSETRON 2 MG/ML
4 INJECTION INTRAMUSCULAR; INTRAVENOUS
Status: DISCONTINUED | OUTPATIENT
Start: 2023-07-25 | End: 2023-07-25 | Stop reason: HOSPADM

## 2023-07-25 RX ORDER — ONDANSETRON 2 MG/ML
4 INJECTION INTRAMUSCULAR; INTRAVENOUS EVERY 6 HOURS PRN
Status: CANCELLED | OUTPATIENT
Start: 2023-07-25

## 2023-07-25 RX ORDER — ONDANSETRON 4 MG/1
4 TABLET, ORALLY DISINTEGRATING ORAL EVERY 6 HOURS PRN
Status: CANCELLED | OUTPATIENT
Start: 2023-07-25

## 2023-07-25 RX ORDER — PROCHLORPERAZINE MALEATE 10 MG
10 TABLET ORAL EVERY 6 HOURS PRN
Status: CANCELLED | OUTPATIENT
Start: 2023-07-25

## 2023-07-25 RX ORDER — LIDOCAINE 40 MG/G
CREAM TOPICAL
Status: DISCONTINUED | OUTPATIENT
Start: 2023-07-25 | End: 2023-07-25 | Stop reason: HOSPADM

## 2023-07-25 ASSESSMENT — ACTIVITIES OF DAILY LIVING (ADL): ADLS_ACUITY_SCORE: 35

## 2023-07-25 NOTE — H&P
Pre-Endoscopy History and Physical     Beny Jerome MRN# [id number]   YOB: 1960 Age: 63 year old     Date of Procedure: (Not on file)  Primary care provider: Nhan Ramos  Type of Endoscopy: colonoscopy  Reason for Procedure: Surveillance of polyps  Type of Anesthesia Anticipated: Moderate Sedation    HPI:    Beny is a 63 year old male who will be undergoing the above procedure.      A history and physical has been performed. The patient's medications and allergies have been reviewed. The risks and benefits of the procedure including the risk of bleeding, perforation, and missed lesions as well as the sedation options and risks were discussed with the patient.  All questions were answered and informed consent was obtained.        Last Colonoscopy:  2015, was due in 2020. Fentanyl 100mcg, Versed 2mg, No polyps found. Given 5 year recall for history of polyps.     Family History of Colorectal Cancer: None  No Known Allergies     No current facility-administered medications for this encounter.     Current Outpatient Medications   Medication    bisacodyl (DULCOLAX) 5 MG EC tablet    polyethylene glycol (GOLYTELY) 236 g suspension    ASPIRIN ADULT LOW STRENGTH PO    atorvastatin (LIPITOR) 40 MG tablet    omeprazole (PRILOSEC) 20 MG DR capsule       No medications prior to admission.       Patient Active Problem List   Diagnosis    GERD (gastroesophageal reflux disease)    CAD (coronary artery disease)    Hyperlipidemia LDL goal <70    Pain in right lumbar region of back    Snoring    Excessive daytime sleepiness    Ganglion cyst of wrist, left    Lumbar radiculopathy    Arthritis of right hip    SVETLANA on CPAP    Past Medical History:   Diagnosis Date    Abnormal stress test     CAD (coronary artery disease)     [facility] - Dr Hummel    Chest pain     GERD (gastroesophageal reflux disease)     Hyperlipidemia LDL goal <100         Past Surgical History:   Procedure Laterality Date    APPENDECTOMY  1971     "CHOLECYSTECTOMY, LAPOROSCOPIC  2006    Cholecystectomy, Laparoscopic    COLONOSCOPY N/A 04/16/2015    Procedure: COLONOSCOPY;  Surgeon: Paco Shaver MD;  Location: [facility]    disc herniation repair cervical  2004    ESOPHAGOSCOPY, GASTROSCOPY, DUODENOSCOPY (EGD), COMBINED N/A 04/16/2015    Procedure: COMBINED ESOPHAGOSCOPY, GASTROSCOPY, DUODENOSCOPY (EGD), BIOPSY SINGLE OR MULTIPLE;  Surgeon: Paco Shaver MD;  Location: [facility]    lipoma      x 3    LITHOTRIPSY  1977    scope removal       Social History     Tobacco Use    Smoking status: Never    Smokeless tobacco: Never   Substance Use Topics    Alcohol use: No     Comment: quit 1990       Family History   Problem Relation Age of Onset    Coronary Artery Disease Father 60        multiple MIs    Leukemia Father     Hyperlipidemia Father     Alzheimer Disease Mother     No Known Problems Brother     Diabetes No family hx of     Hypertension No family hx of     Cerebrovascular Disease No family hx of     Breast Cancer No family hx of     Prostate Cancer No family hx of     Colon Cancer No family hx of          PHYSICAL EXAM:   There were no vitals taken for this visit. Estimated body mass index is 28.37 kg/m  as calculated from the following:    Height as of 6/26/23: 6' 1.5\".    Weight as of 6/26/23: 218 lb.   Mental status - alert and oriented  RESP: lungs clear  CV: RRR  AIRWAY EXAM: Mallampatti Class I (visualization of the soft palate, fauces, uvula, anterior and posterior pillars)    IMPRESSION   ASA Class 2 - Mild systemic disease      Signed Electronically by: Denise Ramirez MD  July 25, 2023    Colorectal Surgery  [phone] (office)  [phone] (pager)  [email or web address]          "

## 2023-07-27 ENCOUNTER — DOCUMENTATION ONLY (OUTPATIENT)
Dept: SLEEP MEDICINE | Facility: CLINIC | Age: 63
End: 2023-07-27
Payer: COMMERCIAL

## 2023-07-27 LAB
PATH REPORT.COMMENTS IMP SPEC: NORMAL
PATH REPORT.COMMENTS IMP SPEC: NORMAL
PATH REPORT.FINAL DX SPEC: NORMAL
PATH REPORT.GROSS SPEC: NORMAL
PATH REPORT.MICROSCOPIC SPEC OTHER STN: NORMAL
PATH REPORT.RELEVANT HX SPEC: NORMAL
PHOTO IMAGE: NORMAL

## 2023-07-27 NOTE — PROGRESS NOTES
30 DAY STM VISIT    Diagnostic AHI:  42.8 HST    Subjective measures:   Patient stated the CPAP mask sometimes stuff up his nose. Patient sometimes wakes up and has trouble catching his breath he feels like he is not getting enough CPAP pressure.      Assessment: Pt not meeting objective benchmarks for AHI Patient failing following subjective benchmarks: mask discomfort  and congestion  Action plan: pt to have 6 month STM visit  Patient has scheduled a follow up visit with Dr. Pascual on 8/23/2023.   Device type: Auto-CPAP  PAP settings: CPAP min 5.0 cm  H20     CPAP max 15.0 cm  H20    95th% pressure 11.9 cm  H20      RESMED EPR level Setting: THREE    RESMED Soft response setting:  OFF  Mask type:  Nasal Mask  Objective measures: 14 day rolling measures      Compliance  100 %      Leak  25.04 lpm  last  upload      AHI 9.43   last  upload      Average number of minutes 417      Objective measure goal  Compliance   Goal >70%  Leak   Goal < 24 lpm  AHI  Goal < 5  Usage  Goal >240        Total time spent on accessing and interpreting remote patient PAP therapy data  10 minutes    Total time spent counseling, coaching  and reviewing PAP therapy data with patient  8 minutes     [id number] this call  [id number] no  at 3 or 14 day [facility]

## 2023-07-28 ENCOUNTER — DOCUMENTATION ONLY (OUTPATIENT)
Dept: SLEEP MEDICINE | Facility: CLINIC | Age: 63
End: 2023-07-28
Payer: COMMERCIAL

## 2023-07-28 NOTE — PROGRESS NOTES
7/28/23- JL- SPOKE WITH JACQUE AND HE STATED HE IS NOT SURE WHAT THE PROBLEM IS.  HE REFUSED TO SCHEDULE 30 DAY MASK EXCHANGE. HE DID SAY THAT CURRENT CUSHION IS TIGHT AT BRIDGE OF NOSE, WE WENT OVER HOW TO ADJUST HEADGEAR EVERYTIME HE PUTS ON MASK.  HE IS GOING TO CALL NEXT WEEK IF HE CHANGES HIS MIND ABOUT MASK EXCHANGE FOR AIRFIT N30I.

## 2023-08-01 NOTE — TELEPHONE ENCOUNTER
Form completed  Sent to [facility]  And filed in [place]   And placed at the  for pickup.

## 2023-08-23 ENCOUNTER — VIRTUAL VISIT (OUTPATIENT)
Dept: SLEEP MEDICINE | Facility: CLINIC | Age: 63
End: 2023-08-23
Payer: COMMERCIAL

## 2023-08-23 VITALS — WEIGHT: 210 LBS | HEIGHT: 72 IN | BODY MASS INDEX: 28.44 KG/M2

## 2023-08-23 DIAGNOSIS — G47.33 OSA ON CPAP: ICD-10-CM

## 2023-08-23 DIAGNOSIS — G47.36 HYPOXEMIA ASSOCIATED WITH SLEEP: ICD-10-CM

## 2023-08-23 DIAGNOSIS — R06.3 CHEYNE-STOKES RESPIRATION: Primary | ICD-10-CM

## 2023-08-23 PROCEDURE — 99215 OFFICE O/P EST HI 40 MIN: CPT | Mod: VID | Performed by: INTERNAL MEDICINE

## 2023-08-23 ASSESSMENT — SLEEP AND FATIGUE QUESTIONNAIRES
HOW LIKELY ARE YOU TO NOD OFF OR FALL ASLEEP WHILE LYING DOWN TO REST IN THE AFTERNOON WHEN CIRCUMSTANCES PERMIT: HIGH CHANCE OF DOZING
HOW LIKELY ARE YOU TO NOD OFF OR FALL ASLEEP WHILE SITTING INACTIVE IN A PUBLIC PLACE: MODERATE CHANCE OF DOZING
HOW LIKELY ARE YOU TO NOD OFF OR FALL ASLEEP WHILE SITTING AND TALKING TO SOMEONE: SLIGHT CHANCE OF DOZING
HOW LIKELY ARE YOU TO NOD OFF OR FALL ASLEEP WHILE SITTING AND READING: HIGH CHANCE OF DOZING
HOW LIKELY ARE YOU TO NOD OFF OR FALL ASLEEP WHEN YOU ARE A PASSENGER IN A CAR FOR AN HOUR WITHOUT A BREAK: SLIGHT CHANCE OF DOZING
HOW LIKELY ARE YOU TO NOD OFF OR FALL ASLEEP WHILE SITTING QUIETLY AFTER LUNCH WITHOUT ALCOHOL: HIGH CHANCE OF DOZING
HOW LIKELY ARE YOU TO NOD OFF OR FALL ASLEEP IN A CAR, WHILE STOPPED FOR A FEW MINUTES IN TRAFFIC: SLIGHT CHANCE OF DOZING
HOW LIKELY ARE YOU TO NOD OFF OR FALL ASLEEP WHILE WATCHING TV: HIGH CHANCE OF DOZING

## 2023-08-23 ASSESSMENT — PAIN SCALES - GENERAL: PAINLEVEL: MILD PAIN (2)

## 2023-08-23 NOTE — NURSING NOTE
Has patient had flu shot for current/most recent flu season? If so, when? Yes: 1/30/23      Is the patient currently in the state of [place]? YES    Visit mode:VIDEO    If the visit is dropped, the patient can be reconnected by: VIDEO VISIT: Text to cell phone:   Telephone Information:   Mobile [phone]       Will anyone else be joining the visit? NO  (If patient encounters technical issues they should call [phone])    How would you like to obtain your AVS? MyChart    Are changes needed to the allergy or medication list? No    Reason for visit: RECHECK    Lorena ZAMBRANO

## 2023-08-23 NOTE — PROGRESS NOTES
Virtual Visit Details    Type of service:  Video Visit     Originating Location (pt. Location): Home    Distant Location (provider location):  Off-site  Platform used for Video Visit: [facility]  Sleep clinic follow-up visit note     Date: August 23, 2023     Chief complaint:  Follow-up of SVETLAAN, review CPAP compliance measures    Beny Jerome  is a 63 year old male who presents to sleep clinic for follow-up of previously diagnosed  obstructive sleep apnea (HST 5/17/23 showed AHI 42.8/hr) that is currently managed with CPAP therapy.    He was set up at [place] on June 26, 2023. Patient received a Resmed Airsense 11 Pressures were set at 5-15 cm H2O.   Patient s ramp is 5 cm H2O for Auto and FLEX/EPR is EPR, 2.  Patient received a Resmed Mask name: Airtouch F20  Full Face mask size Medium, heated tubing and heated humidifier.     Previous sleep study report: (Home sleep study)  Study date: 5/17/2023  Analysis Time: 339.5 minutes  Respiration:   Sleep Associated Hypoxemia: sustained hypoxemia was present.  Average oxygen saturation was 91.1%.  Time with saturation less than or equal to 88% was 43.1 minutes. The lowest oxygen saturation was 72%.   Snoring: Snoring was present.  Respiratory events: The home study revealed a presence of 72 obstructive apneas and 93 mixed and central apneas. There were 75 hypopneas resulting in a combined apnea/hypopnea index [AHI] of 42.8 events per hour.  AHI was 68.9 per hour supine, n/a per hour prone, 5.6 per hour on left side, and n/a  on right side.   Pattern: Excluding events noted above, respiratory rate was normal and there was periodicity in the breathing pattern pattern without prolonged circulation time and  cycle length less than 40 seconds, not consistent with Cheyne-Aden respiration.     Position: Percent of time spent: supine -58.2%, prone -0%, on left -40.9%, on right -0%.     Heart Rate: By pulse oximetry, average pulse rate was normal at 76  bpm.  The minimum pulse rate was 62 bpm and the maximum pulse rate was 103 bpm.     Assessment:   Severe sleep apnea was present with both obstructive and central apneas, predominantly obstructive apneas, pronounced during supine sleep position.  Sleep associated hypoxemia was present.    Pressure settings on CPAP ranged between 5 to 15 cmH2O  Patient reports using the CPAP regularly during sleep. He uses full face mask.   Patient reported that recently on 1 occasion his wife reported that she heard him snoring and noted that he was struggling to breathe while he was using his CPAP. Other than that there are no reports of snoring or apneas with the CPAP use.  Patient reports occasionally waking up gasping for air with the device use.   Pressure settings feel comfortable.  Previously he used to wake up 3-4 times during the night, but since he has been using the CPAP he has been waking up only once, which he considers is an improvement   Patient reports waking up feeling rested most mornings since he has been using the CPAP, though occasionally he still reports nonrestorative sleep. He notices some improvement in energy levels.  He reports occasional daytime sleepiness.  Today, he endorses ESS of 11/24.(Pre-CPAP treatment he endorsed Gould sleepiness score of 17 out of 24.)    DOWNLOADABLE COMPLIANCE DATA: (ResMed) 5 to 15 cm water  From 7/22/2023 through 8/20/2023 reveals that patient to use the device for 30 out of 30 days with an averag use of 7 hours and 2 minutes on the days used. 95th percentile on leak was 30 L/min. Residual AHI was 9.1 per hour(obstructive apnea index: 1.5/h; central apnea index: 3.8/h; hypopnea index: 0.9/h; unknown: 2.8/h and Cheyne-Aden respiration was observed for 4 minutes (1%)  Median pressure settings: 8.5; 95th percentile : 11.7 and max pressure: 13.3 cm water    Overnight oximetry test (7/7/23)  Overnight oximetry obtained with auto CPAP with pressure settings 5 to 15 cm water,  showed hypoxemia with approximately 12.2 minutes with O2 saturations at or below 88%.    Test results were communicated with the patient.      Past medical/surgical history, family history, social history, medications and allergies were reviewed.              Physical Examination:   General: Pleasant. Cooperative. In no apparent distress.  Pulmonary: Able to speak in full sentences easily. No cough or wheeze.   Neurologic: Alert, oriented x3.  Psychiatric: Mood euthymic. Affect congruent with full range and intensity.     ASSESSMENT/PLAN:  Obstructive sleep apnea and sleep associated hypoxemia: Pt reports adequate compliance with CPAP and reports  some positive benefits with CPAP treatment. Based on compliance measures, the residual AHI is still high, even though the obstructive apnea index and the central apnea index were both less than 5 events per hour.  Brief Cheyne-Aden respiration was also reported per the compliance download.  DME orders were generated for revision of the pressure settings on the auto CPAP to range 8 to 15 cm water  based on the compliance download and patient was instructed to follow-up with [facility] to obtain mask fit optimization to alleviate the air leaks.   He will be followed through [facility] program.    Sleep associated hypoxemia: Recommended oxygen supplementation at 1 LPM  to bleed through the CPAP circuit. Recommended to use the CPAP along with the oxygen at 1 LPM through the CPAP regularly during sleep.   Recommended obtaining overnight oximetry with the auto CPAP at the revised pressure settings 8-15 cm H2O along with O2 at 1 LPM and follow the results.  Plan to communicate overnight oximetry results via [employer]. If hypoxemia  persists, will consider obtaining polysomnography with titration using positive airway pressure device to establish the optimum treatment to control the sleep-related breathing disorder.    Recommend obtaining an echocardiogram to evaluate the left  "ventricle ejection fraction since  periodicity in the breathing pattern with prolonged circulation time was observed during the home sleep study and  Cheyne-Aden respiration was reported per compliance download.    Obesity: We discussed weight management with healthy diet, and exercise.     Patient was strongly advised to avoid driving, operating any heavy machinery or other hazardous situations while drowsy or sleepy.  Patient was counseled on the importance of driving while alert, to pull over if drowsy, or nap before getting into the vehicle if sleepy.         The above note was dictated using voice recognition software. Although reviewed after completion, some word and grammatical error may remain . Please contact the author for any clarifications.      \" Total time spent was 40 minutes  for this appointment on this date of service which include time spent before, during and after the visit for chart review, patient care, counseling and coordination of care. Including documentation\"      Vignesh Pascual MD  [facility]  [address] , [place] [address]      I certify that this patient, Beny Jerome has been under my care (or a nurse practitioner or physican's assistant working with me). This is the face-to-face encounter for oxygen medical necessity.      At the time of this encounter supplemental oxygen is reasonable and necessary and is expected to improve the patient's condition in a home setting.       Patient has continued oxygen desaturation due to CAD I25.10.    If portability is ordered, is the patient mobile within the home? yes     "

## 2023-10-06 ENCOUNTER — DOCUMENTATION ONLY (OUTPATIENT)
Dept: HOME HEALTH SERVICES | Facility: CLINIC | Age: 63
End: 2023-10-06
Payer: COMMERCIAL

## 2023-10-06 NOTE — NURSING NOTE
My Chart message sent to patient:  Harsh Swan,    It looks like Dr. Sadler wanted you to schedule an echocardiogram of your heart.  Please call [phone] and they can get you scheduled. Have a good weekend!    [facility]  Berenice Carrington, TUNDE

## 2023-10-06 NOTE — PROGRESS NOTES
8/28/2023 PT complete ADELIA  8/30/2023 ADELIA results were uploaded to [facility]  9/13/2023 PT was setup on home oxygen with [facility]

## 2023-10-06 NOTE — NURSING NOTE
DME orders generated for revision of pressure settings and auto CPAP to range 8 to 15 cm water. Pressures were updated.   DME orders generated for oxygen supplementation at 1 L/min--called [facility] and they will get order started.  Please help scheduling an overnight oximetry with auto CPAP at the revised pressure settings along with oxygen--called  [facility] and they will get order started. Plan to communicate results to the patient via [employer]   Please help in scheduling echocardiogram as soon as possible--message with number sent to patient. Follow-up will be based on the results of the overnight oximetry per MD.   Berenice Carrington, CMA

## 2023-10-10 ENCOUNTER — HOSPITAL ENCOUNTER (OUTPATIENT)
Dept: CARDIOLOGY | Facility: CLINIC | Age: 63
Discharge: HOME | End: 2023-10-10
Attending: INTERNAL MEDICINE | Admitting: INTERNAL MEDICINE
Payer: COMMERCIAL

## 2023-10-10 DIAGNOSIS — R06.3 CHEYNE-STOKES RESPIRATION: ICD-10-CM

## 2023-10-10 PROCEDURE — 93306 TTE W/DOPPLER COMPLETE: CPT | Mod: 26 | Performed by: INTERNAL MEDICINE

## 2023-10-10 PROCEDURE — 93306 TTE W/DOPPLER COMPLETE: CPT

## 2023-11-22 ENCOUNTER — PATIENT OUTREACH (OUTPATIENT)
Dept: GASTROENTEROLOGY | Facility: CLINIC | Age: 63
End: 2023-11-22
Payer: COMMERCIAL

## 2024-02-21 DIAGNOSIS — E78.5 HYPERLIPIDEMIA LDL GOAL <70: ICD-10-CM

## 2024-02-21 DIAGNOSIS — I25.10 CORONARY ARTERY DISEASE INVOLVING NATIVE HEART WITHOUT ANGINA PECTORIS, UNSPECIFIED VESSEL OR LESION TYPE: ICD-10-CM

## 2024-02-21 RX ORDER — ATORVASTATIN CALCIUM 40 MG/1
40 TABLET, FILM COATED ORAL DAILY
Qty: 90 TABLET | Refills: 0 | Status: SHIPPED | OUTPATIENT
Start: 2024-02-21 | End: 2024-05-23

## 2024-02-21 NOTE — TELEPHONE ENCOUNTER
Medication filled 1 time as pt is due for a follow-up in clinic. Pharmacy has been notified to inform patient to call clinic and schedule appointment.    Prescription approved per [facility] Refill Protocol.  Areli Porter RN  [facility] Triage Nurse

## 2024-03-24 ENCOUNTER — HEALTH MAINTENANCE LETTER (OUTPATIENT)
Age: 64
End: 2024-03-24

## 2024-04-08 DIAGNOSIS — K21.9 GASTROESOPHAGEAL REFLUX DISEASE WITHOUT ESOPHAGITIS: ICD-10-CM

## 2024-05-23 DIAGNOSIS — I25.10 CORONARY ARTERY DISEASE INVOLVING NATIVE HEART WITHOUT ANGINA PECTORIS, UNSPECIFIED VESSEL OR LESION TYPE: ICD-10-CM

## 2024-05-23 DIAGNOSIS — E78.5 HYPERLIPIDEMIA LDL GOAL <70: ICD-10-CM

## 2024-05-23 RX ORDER — ATORVASTATIN CALCIUM 40 MG/1
40 TABLET, FILM COATED ORAL DAILY
Qty: 90 TABLET | Refills: 0 | Status: SHIPPED | OUTPATIENT
Start: 2024-05-23 | End: 2024-08-26

## 2024-07-08 DIAGNOSIS — K21.9 GASTROESOPHAGEAL REFLUX DISEASE WITHOUT ESOPHAGITIS: ICD-10-CM

## 2024-08-09 ENCOUNTER — DOCUMENTATION ONLY (OUTPATIENT)
Dept: SLEEP MEDICINE | Facility: CLINIC | Age: 64
End: 2024-08-09
Payer: COMMERCIAL

## 2024-08-09 DIAGNOSIS — I25.10 ATHSCL HEART DISEASE OF NATIVE CORONARY ARTERY W/O ANG PCTRS: Primary | ICD-10-CM

## 2024-08-24 DIAGNOSIS — E78.5 HYPERLIPIDEMIA LDL GOAL <70: ICD-10-CM

## 2024-08-24 DIAGNOSIS — I25.10 CORONARY ARTERY DISEASE INVOLVING NATIVE HEART WITHOUT ANGINA PECTORIS, UNSPECIFIED VESSEL OR LESION TYPE: ICD-10-CM

## 2024-08-26 RX ORDER — ATORVASTATIN CALCIUM 40 MG/1
40 TABLET, FILM COATED ORAL DAILY
Qty: 90 TABLET | Refills: 0 | Status: SHIPPED | OUTPATIENT
Start: 2024-08-26 | End: 2024-10-04

## 2024-08-26 NOTE — TELEPHONE ENCOUNTER
Has had previous Sheila refills.- Due for a Wellness visit,  please call and schedule. Only 1 refill has been sent.

## 2024-10-04 ENCOUNTER — OFFICE VISIT (OUTPATIENT)
Dept: FAMILY MEDICINE | Facility: CLINIC | Age: 64
End: 2024-10-04
Payer: COMMERCIAL

## 2024-10-04 VITALS
HEIGHT: 72 IN | OXYGEN SATURATION: 98 % | TEMPERATURE: 98.2 F | BODY MASS INDEX: 28.91 KG/M2 | RESPIRATION RATE: 16 BRPM | SYSTOLIC BLOOD PRESSURE: 128 MMHG | WEIGHT: 213.4 LBS | HEART RATE: 58 BPM | DIASTOLIC BLOOD PRESSURE: 74 MMHG

## 2024-10-04 DIAGNOSIS — Z12.5 SCREENING FOR PROSTATE CANCER: ICD-10-CM

## 2024-10-04 DIAGNOSIS — Z00.00 ENCOUNTER FOR MEDICARE ANNUAL WELLNESS EXAM: Primary | ICD-10-CM

## 2024-10-04 DIAGNOSIS — K21.9 GASTROESOPHAGEAL REFLUX DISEASE WITHOUT ESOPHAGITIS: ICD-10-CM

## 2024-10-04 DIAGNOSIS — E78.5 HYPERLIPIDEMIA LDL GOAL <70: ICD-10-CM

## 2024-10-04 DIAGNOSIS — R10.9 RIGHT FLANK PAIN: ICD-10-CM

## 2024-10-04 DIAGNOSIS — M54.2 NECK PAIN: ICD-10-CM

## 2024-10-04 DIAGNOSIS — I25.10 CORONARY ARTERY DISEASE INVOLVING NATIVE HEART WITHOUT ANGINA PECTORIS, UNSPECIFIED VESSEL OR LESION TYPE: ICD-10-CM

## 2024-10-04 LAB
ALBUMIN UR-MCNC: NEGATIVE MG/DL
APPEARANCE UR: CLEAR
BILIRUB UR QL STRIP: NEGATIVE
COLOR UR AUTO: YELLOW
ERYTHROCYTE [DISTWIDTH] IN BLOOD BY AUTOMATED COUNT: 12.9 % (ref 10–15)
GLUCOSE UR STRIP-MCNC: NEGATIVE MG/DL
HCT VFR BLD AUTO: 44.1 % (ref 40–53)
HGB BLD-MCNC: 15.3 G/DL (ref 13.3–17.7)
HGB UR QL STRIP: NEGATIVE
KETONES UR STRIP-MCNC: NEGATIVE MG/DL
LEUKOCYTE ESTERASE UR QL STRIP: NEGATIVE
MCH RBC QN AUTO: 31.3 PG (ref 26.5–33)
MCHC RBC AUTO-ENTMCNC: 34.7 G/DL (ref 31.5–36.5)
MCV RBC AUTO: 90 FL (ref 78–100)
NITRATE UR QL: NEGATIVE
PH UR STRIP: 6 [PH] (ref 5–7)
PLATELET # BLD AUTO: 244 10E3/UL (ref 150–450)
RBC # BLD AUTO: 4.89 10E6/UL (ref 4.4–5.9)
SP GR UR STRIP: 1.02 (ref 1–1.03)
UROBILINOGEN UR STRIP-ACNC: 0.2 E.U./DL
WBC # BLD AUTO: 7.7 10E3/UL (ref 4–11)

## 2024-10-04 PROCEDURE — 85027 COMPLETE CBC AUTOMATED: CPT | Performed by: FAMILY MEDICINE

## 2024-10-04 PROCEDURE — 90471 IMMUNIZATION ADMIN: CPT | Performed by: FAMILY MEDICINE

## 2024-10-04 PROCEDURE — 90677 PCV20 VACCINE IM: CPT | Performed by: FAMILY MEDICINE

## 2024-10-04 PROCEDURE — 80061 LIPID PANEL: CPT | Performed by: FAMILY MEDICINE

## 2024-10-04 PROCEDURE — 99214 OFFICE O/P EST MOD 30 MIN: CPT | Mod: 25 | Performed by: FAMILY MEDICINE

## 2024-10-04 PROCEDURE — 80053 COMPREHEN METABOLIC PANEL: CPT | Performed by: FAMILY MEDICINE

## 2024-10-04 PROCEDURE — 81003 URINALYSIS AUTO W/O SCOPE: CPT | Performed by: FAMILY MEDICINE

## 2024-10-04 PROCEDURE — 90472 IMMUNIZATION ADMIN EACH ADD: CPT | Performed by: FAMILY MEDICINE

## 2024-10-04 PROCEDURE — 36415 COLL VENOUS BLD VENIPUNCTURE: CPT | Performed by: FAMILY MEDICINE

## 2024-10-04 PROCEDURE — G0103 PSA SCREENING: HCPCS | Performed by: FAMILY MEDICINE

## 2024-10-04 PROCEDURE — 90662 IIV NO PRSV INCREASED AG IM: CPT | Performed by: FAMILY MEDICINE

## 2024-10-04 PROCEDURE — 99397 PER PM REEVAL EST PAT 65+ YR: CPT | Mod: 25 | Performed by: FAMILY MEDICINE

## 2024-10-04 RX ORDER — NAPROXEN SODIUM 220 MG/1
440 TABLET, FILM COATED ORAL 2 TIMES DAILY WITH MEALS
COMMUNITY
Start: 2024-10-04 | End: 2024-10-11

## 2024-10-04 RX ORDER — ATORVASTATIN CALCIUM 40 MG/1
40 TABLET, FILM COATED ORAL DAILY
Qty: 90 TABLET | Refills: 4 | Status: SHIPPED | OUTPATIENT
Start: 2024-10-04

## 2024-10-04 SDOH — HEALTH STABILITY: PHYSICAL HEALTH: ON AVERAGE, HOW MANY MINUTES DO YOU ENGAGE IN EXERCISE AT THIS LEVEL?: 30 MIN

## 2024-10-04 SDOH — HEALTH STABILITY: PHYSICAL HEALTH: ON AVERAGE, HOW MANY DAYS PER WEEK DO YOU ENGAGE IN MODERATE TO STRENUOUS EXERCISE (LIKE A BRISK WALK)?: 7 DAYS

## 2024-10-04 ASSESSMENT — SOCIAL DETERMINANTS OF HEALTH (SDOH): HOW OFTEN DO YOU GET TOGETHER WITH FRIENDS OR RELATIVES?: ONCE A WEEK

## 2024-10-04 NOTE — PROGRESS NOTES
Preventive Care Visit  [facility] [place]  Nhan Ramos MD, Family Medicine  Oct 4, 2024        Assessment & Plan     Encounter for Medicare annual wellness exam      Screening for prostate cancer  - PROSTATE SPEC ANTIGEN SCREEN  - PROSTATE SPEC ANTIGEN SCREEN    Coronary artery disease involving native heart without angina pectoris, unspecified vessel or lesion type  Stable - continue medication(s).  - atorvastatin (LIPITOR) 40 MG tablet  Dispense: 90 tablet; Refill: 4    Hyperlipidemia LDL goal <70  Stable - continue medication(s)   - COMPREHENSIVE METABOLIC PANEL  - Lipid panel reflex to direct LDL Non-fasting  - atorvastatin (LIPITOR) 40 MG tablet  Dispense: 90 tablet; Refill: 4  - COMPREHENSIVE METABOLIC PANEL  - Lipid panel reflex to direct LDL Non-fasting    Gastroesophageal reflux disease without esophagitis  Controlled - continue medication.   - CBC with Platelets  - omeprazole (PRILOSEC) 20 MG DR capsule  Dispense: 90 capsule; Refill: 4  - CBC with Platelets    Neck pain  - Neck pain likely due to tight muscles, possibly related to previous surgery and disc removal. No signs of disc issues in current examination.  - Home exercises, possible muscle relaxer, consider chiropractic treatment. Naproxen or Aleve recommended for pain and inflammation.  - naproxen sodium (ALEVE) 220 MG tablet    Right flank pain  - Right flank pain, cause uncertain. Could be related to scar tissue from past surgeries, pressure on lower ribs, or other factors.  - Check liver function, continue monitoring symptoms.  - UA Macroscopic with reflex to Microscopic and Culture - Lab Collect  - UA Macroscopic with reflex to Microscopic and Culture - Lab Collect          BMI  Estimated body mass index is 28.94 kg/m  as calculated from the following:    Height as of this encounter: 1.829 m (6').    Weight as of this encounter: 96.8 kg (213 lb 6.4 oz).         Reviewed preventive health counseling, as reflected in  patient instructions    No follow-ups on file.    Follow-up Visit   Expected date:  Oct 11, 2025 (Approximate)      Follow Up Appointment Details:     Follow-up with whom?: PCP    Follow-Up for what?: Medicare Wellness    Welcome or Annual?: Annual Wellness    How?: In Person                         Anand Ramos MD     [facility]  [address] [place] [id number]  [email or web address]     Office: [phone]           Kt Swan is a 64 year old, presenting for the following:  Medicare Visit        10/4/2024    10:48 AM   Additional Questions   Roomed by Nadeen GRIFFITHS        Health Care Directive  Patient does not have a Health Care Directive or Living Will: Discussed advance care planning with patient; however, patient declined at this time.    HPI  Patient having right neck pain started almost 2 weeks ago.  Hard to turn neck. Has used tylenol and ibuprofen.      Neck Pain  He has been experiencing bilateral neck pain, particularly on the right side, for a couple of weeks. The pain varies in intensity, with some days being better than others. He struggles to turn his head to the right due to the pain.     Right Flank Pain  He has been experiencing sharp pain in his right flank. The pain is persistent and lasts for about two days at a time. The pain is not positional and does not seem to be related to movement. The area is tender to the touch. There is no blood in the urine.       Hyperlipidemia Follow-Up    Are you regularly taking any medication or supplement to lower your cholesterol?   Yes- atorvastatin  Are you having muscle aches or other side effects that you think could be caused by your cholesterol lowering medication?  No        10/4/2024   General Health   How would you rate your overall physical health? (!) FAIR   Feel stress (tense, anxious, or unable to sleep) Only a little      (!) STRESS CONCERN      10/4/2024   Nutrition   Diet: Regular (no restrictions)    Breakfast  skipped       Multiple values from one day are sorted in reverse-chronological order         10/4/2024   Exercise   Days per week of moderate/strenous exercise 7 days   Average minutes spent exercising at this level 30 min            10/4/2024   Social Factors   Frequency of gathering with friends or relatives Once a week   Worry food won't last until get money to buy more No   Food not last or not have enough money for food? No   Do you have housing? (Housing is defined as stable permanent housing and does not include staying ouside in a car, in a tent, in an abandoned building, in an overnight shelter, or couch-surfing.) Yes   Are you worried about losing your housing? No   Lack of transportation? No   Unable to get utilities (heat,electricity)? No            10/4/2024   Fall Risk   Fallen 2 or more times in the past year? No    No   Trouble with walking or balance? No    No       Multiple values from one day are sorted in reverse-chronological order          10/4/2024   Activities of Daily Living- Home Safety   Needs help with the following daily activites None of the above   Safety concerns in the home No grab bars in the bathroom            10/4/2024   Dental   Dentist two times every year? (!) NO            10/4/2024   Hearing Screening   Hearing concerns? (!) I FEEL THAT PEOPLE ARE MUMBLING OR NOT SPEAKING CLEARLY.    (!) I NEED TO ASK PEOPLE TO SPEAK UP OR REPEAT THEMSELVES.    (!) IT'S HARD TO FOLLOW A CONVERSATION IN A NOISY RESTAURANT OR CROWDED ROOM.    (!) TROUBLE UNDERSTANDING SOFT OR WHISPERED SPEECH.       Multiple values from one day are sorted in reverse-chronological order         10/4/2024   Driving Risk Screening   Patient/family members have concerns about driving No            10/4/2024   General Alertness/Fatigue Screening   Have you been more tired than usual lately? (!) YES            10/4/2024   Urinary Incontinence Screening   Bothered by leaking urine in past 6 months No             10/4/2024   TB Screening   Were you born outside of the [place]? No            Today's PHQ-2 Score:       10/4/2024    10:32 AM   PHQ-2 ( 1999 [employer])   Q1: Little interest or pleasure in doing things 1   Q2: Feeling down, depressed or hopeless 1   PHQ-2 Score 2   Q1: Little interest or pleasure in doing things Several days   Q2: Feeling down, depressed or hopeless Several days   PHQ-2 Score 2           10/4/2024   Substance Use   Alcohol more than 3/day or more than 7/wk Not Applicable   Do you have a current opioid prescription? No   How severe/bad is pain from 1 to 10? 8/10   Do you use any other substances recreationally? No        Social History     Tobacco Use    Smoking status: Never    Smokeless tobacco: Never   Vaping Use    Vaping status: Never Used   Substance Use Topics    Alcohol use: No     Comment: quit 1990    Drug use: No           10/4/2024   AAA Screening   Family history of Abdominal Aortic Aneurysm (AAA)? Unsure      Last PSA:   PSA   Date Value Ref Range Status   10/12/2020 0.89 0 - 4 ug/L Final     Comment:     Assay Method:  Chemiluminescence using Siemens Vista analyzer     Prostate Specific Antigen Screen   Date Value Ref Range Status   10/04/2024 1.53 0.00 - 4.50 ng/mL Final   12/13/2021 1.33 0.00 - 4.00 ug/L Final     ASCVD Risk   The 10-year ASCVD risk score (Catalina SANCHEZ, et al., 2019) is: 10.2%    Values used to calculate the score:      Age: 64 years      Sex: Male      Is Non- : No      Diabetic: No      Tobacco smoker: No      Systolic Blood Pressure: 128 mmHg      Is BP treated: No      HDL Cholesterol: 50 mg/dL      Total Cholesterol: 146 mg/dL            Reviewed and updated as needed this visit by Provider   Tobacco  Allergies  Meds  Problems  Med Hx  Surg Hx  Fam Hx              Current providers sharing in care for this patient include:  Patient Care Team:  Nhan Ramos MD as PCP - General (Family Practice)  Bari Grubbs MD as  Family Medicine Physician (Family Practice)  Nhan Ramos MD as Assigned PCP  Vignesh Pascual MD as Assigned Sleep Provider    The following health maintenance items are reviewed in Epic and correct as of today:  Health Maintenance   Topic Date Due    RSV VACCINE (1 - Risk 60-74 years 1-dose series) Never done    COVID-19 Vaccine (4 - 2024-25 season) 09/01/2024    MEDICARE ANNUAL WELLNESS VISIT  10/04/2025    CMP  10/04/2025    LIPID  10/04/2025    PSA  10/04/2025    ANNUAL REVIEW OF HM ORDERS  10/04/2025    FALL RISK ASSESSMENT  10/04/2025    CBC  10/04/2025    GLUCOSE  10/04/2027    ADVANCE CARE PLANNING  10/04/2029    COLORECTAL CANCER SCREENING  07/25/2030    DTAP/TDAP/TD IMMUNIZATION (3 - Td or Tdap) 12/13/2031    HEPATITIS C SCREENING  Completed    PHQ-2 (once per calendar year)  Completed    INFLUENZA VACCINE  Completed    Pneumococcal Vaccine: 65+ Years  Completed    ZOSTER IMMUNIZATION  Addressed    HPV IMMUNIZATION  Aged Out    MENINGITIS IMMUNIZATION  Aged Out    RSV MONOCLONAL ANTIBODY  Aged Out    HIV SCREENING  Discontinued        Objective    Exam  /74   Pulse 58   Temp 98.2  F (36.8  C) (Tympanic)   Resp 16   Ht 1.829 m (6')   Wt 96.8 kg (213 lb 6.4 oz)   SpO2 98%   BMI 28.94 kg/m     Estimated body mass index is 28.94 kg/m  as calculated from the following:    Height as of this encounter: 1.829 m (6').    Weight as of this encounter: 96.8 kg (213 lb 6.4 oz).    Physical Exam  GENERAL: healthy, alert and no distress  EYES: Eyes grossly normal to inspection, PERRL and conjunctivae and sclerae normal  HENT: ear canals and TM's normal, nose and mouth without ulcers or lesions  NECK: no adenopathy, no asymmetry, masses, or scars and thyroid normal to palpation  RESP: lungs clear to auscultation - no rales, rhonchi or wheezes  BREAST: normal without masses, tenderness or nipple discharge and no palpable axillary masses or adenopathy  CV: regular rate and rhythm,  normal S1 S2, no S3 or S4, no murmur, click or rub, no peripheral edema and peripheral pulses strong  ABDOMEN: soft, nontender, no hepatosplenomegaly, no masses and bowel sounds normal   (male): normal male genitalia without lesions or urethral discharge, no hernia  MS: no gross musculoskeletal defects noted, no edema  SKIN: no suspicious lesions or rashes  NEURO: Normal strength and tone, mentation intact and speech normal  PSYCH: mentation appears normal, affect normal/bright  LYMPH: no cervical, supraclavicular, axillary, or inguinal adenopathy   RECTAL: declined exam          10/4/2024   Mini Cog   Clock Draw Score 2 Normal   3 Item Recall 3 objects recalled   Mini Cog Total Score 5            Vision Screen  Vision Screen Results: Pass      Signed Electronically by: Nhan Ramos MD

## 2024-10-04 NOTE — PATIENT INSTRUCTIONS
Patient Education   Preventive Care Advice   This is general advice given by our system to help you stay healthy. However, your care team may have specific advice just for you. Please talk to your care team about your preventive care needs.  Nutrition  Eat 5 or more servings of fruits and vegetables each day.  Try wheat bread, brown rice and whole grain pasta (instead of white bread, rice, and pasta).  Get enough calcium and vitamin D. Check the label on foods and aim for 100% of the RDA (recommended daily allowance).  Lifestyle  Exercise at least 150 minutes each week  (30 minutes a day, 5 days a week).  Do muscle strengthening activities 2 days a week. These help control your weight and prevent disease.  No smoking.  Wear sunscreen to prevent skin cancer.  Have a dental exam and cleaning every 6 months.  Yearly exams  See your health care team every year to talk about:  Any changes in your health.  Any medicines your care team has prescribed.  Preventive care, family planning, and ways to prevent chronic diseases.  Shots (vaccines)   HPV shots (up to age 26), if you've never had them before.  Hepatitis B shots (up to age 59), if you've never had them before.  COVID-19 shot: Get this shot when it's due.  Flu shot: Get a flu shot every year.  Tetanus shot: Get a tetanus shot every 10 years.  Pneumococcal, hepatitis A, and RSV shots: Ask your care team if you need these based on your risk.  Shingles shot (for age 50 and up)  General health tests  Diabetes screening:  Starting at age 35, Get screened for diabetes at least every 3 years.  If you are younger than age 35, ask your care team if you should be screened for diabetes.  Cholesterol test: At age 39, start having a cholesterol test every 5 years, or more often if advised.  Bone density scan (DEXA): At age 50, ask your care team if you should have this scan for osteoporosis (brittle bones).  Hepatitis C: Get tested at least once in your life.  STIs (sexually  transmitted infections)  Before age 24: Ask your care team if you should be screened for STIs.  After age 24: Get screened for STIs if you're at risk. You are at risk for STIs (including HIV) if:  You are sexually active with more than one person.  You don't use condoms every time.  You or a partner was diagnosed with a sexually transmitted infection.  If you are at risk for HIV, ask about PrEP medicine to prevent HIV.  Get tested for HIV at least once in your life, whether you are at risk for HIV or not.  Cancer screening tests  Cervical cancer screening: If you have a cervix, begin getting regular cervical cancer screening tests starting at age 21.  Breast cancer scan (mammogram): If you've ever had breasts, begin having regular mammograms starting at age 40. This is a scan to check for breast cancer.  Colon cancer screening: It is important to start screening for colon cancer at age 45.  Have a colonoscopy test every 10 years (or more often if you're at risk) Or, ask your provider about stool tests like a FIT test every year or Cologuard test every 3 years.  To learn more about your testing options, visit:   .  For help making a decision, visit:   https://bit.ly/qo45075.  Prostate cancer screening test: If you have a prostate, ask your care team if a prostate cancer screening test (PSA) at age 55 is right for you.  Lung cancer screening: If you are a current or former smoker ages 50 to 80, ask your care team if ongoing lung cancer screenings are right for you.  For informational purposes only. Not to replace the advice of your health care provider. Copyright   2023 [facility]. All rights reserved. Clinically reviewed by the [facility] Transitions Program. HitchedPic [id number] - REV 01/24.  Learning About Activities of Daily Living  What are activities of daily living?     Activities of daily living (ADLs) are the basic self-care tasks you do every day. These include eating, bathing, dressing,  and moving around.  As you age, and if you have health problems, you may find that it's harder to do some of these tasks. If so, your doctor can suggest ideas that may help.  To measure what kind of help you may need, your doctor will ask how well you are able to do ADLs. Let your doctor know if there are any tasks that you are having trouble doing. This is an important first step to getting help. And when you have the help you need, you can stay as independent as possible.  How will a doctor assess your ADLs?  Asking about ADLs is part of a routine health checkup your doctor will likely do as you age. Your health check might be done in a doctor's office, in your home, or at a hospital. The goal is to find out if you are having any problems that could make it hard to care for yourself or that make it unsafe for you to be on your own.  To measure your ADLs, your doctor will ask how hard it is for you to do routine tasks. Your doctor may also want to know if you have changed the way you do a task because of a health problem. Your doctor may watch how you:  Walk back and forth.  Keep your balance while you stand or walk.  Move from sitting to standing or from a bed to a chair.  Button or unbutton a shirt or sweater.  Remove and put on your shoes.  It's common to feel a little worried or anxious if you find you can't do all the things you used to be able to do. Talking with your doctor about ADLs is a way to make sure you're as safe as possible and able to care for yourself as well as you can. You may want to bring a caregiver, friend, or family member to your checkup. They can help you talk to your doctor.  Follow-up care is a key part of your treatment and safety. Be sure to make and go to all appointments, and call your doctor if you are having problems. It's also a good idea to know your test results and keep a list of the medicines you take.  Current as of: October 24, 2023  Content Version: 14.2 2024 [facility]  [place].   Care instructions adapted under license by your healthcare professional. If you have questions about a medical condition or this instruction, always ask your healthcare professional. [employer] disclaims any warranty or liability for your use of this information.    Hearing Loss: Care Instructions  Overview     Hearing loss is a sudden or slow decrease in how well you hear. It can range from slight to profound. Permanent hearing loss can occur with aging. It also can happen when you are exposed long-term to loud noise. Examples include listening to loud music, riding motorcycles, or being around other loud machines.  Hearing loss can affect your work and home life. It can make you feel lonely or depressed. You may feel that you have lost your independence. But hearing aids and other devices can help you hear better and feel connected to others.  Follow-up care is a key part of your treatment and safety. Be sure to make and go to all appointments, and call your doctor if you are having problems. It's also a good idea to know your test results and keep a list of the medicines you take.  How can you care for yourself at home?  Avoid loud noises whenever possible. This helps keep your hearing from getting worse.  Always wear hearing protection around loud noises.  Wear a hearing aid as directed.  A professional can help you pick a hearing aid that will work best for you.  You can also get hearing aids over the counter for mild to moderate hearing loss.  Have hearing tests as your doctor suggests. They can show whether your hearing has changed. Your hearing aid may need to be adjusted.  Use other devices as needed. These may include:  Telephone amplifiers and hearing aids that can connect to a television, stereo, radio, or microphone.  Devices that use lights or vibrations. These alert you to the doorbell, a ringing telephone, or a baby monitor.  Television closed-captioning. This shows  "the words at the bottom of the screen. Most new TVs can do this.  TTY (text telephone). This lets you type messages back and forth on the telephone instead of talking or listening. These devices are also called TDD. When messages are typed on the keyboard, they are sent over the phone line to a receiving TTY. The message is shown on a monitor.  Use text messaging, social media, and email if it is hard for you to communicate by telephone.  Try to learn a listening technique called speechreading. It is not lipreading. You pay attention to people's gestures, expressions, posture, and tone of voice. These clues can help you understand what a person is saying. Face the person you are talking to, and have them face you. Make sure the lighting is good. You need to see the other person's face clearly.  Think about counseling if you need help to adjust to your hearing loss.  When should you call for help?  Watch closely for changes in your health, and be sure to contact your doctor if:    You think your hearing is getting worse.     You have new symptoms, such as dizziness or nausea.   Where can you learn more?  Go to https://www.TabletKiosk.net/patiented  Enter R798 in the search box to learn more about \"Hearing Loss: Care Instructions.\"  Current as of: September 27, 2023  Content Version: 14.2 2024 [employer].   Care instructions adapted under license by your healthcare professional. If you have questions about a medical condition or this instruction, always ask your healthcare professional. [employer] disclaims any warranty or liability for your use of this information.    Learning About Sleeping Well  What does sleeping well mean?     Sleeping well means getting enough sleep to feel good and stay healthy. How much sleep is enough varies among people.  The number of hours you sleep and how you feel when you wake up are both important. If you do not feel refreshed, you probably need more sleep. " "Another sign of not getting enough sleep is feeling tired during the day.  Experts recommend that adults get at least 7 or more hours of sleep per day. Children and older adults need more sleep.  Why is getting enough sleep important?  Getting enough quality sleep is a basic part of good health. When your sleep suffers, your physical health, mood, and your thoughts can suffer too. You may find yourself feeling more grumpy or stressed. Not getting enough sleep also can lead to serious problems, including injury, accidents, anxiety, and depression.  What might cause poor sleeping?  Many things can cause sleep problems, including:  Changes to your sleep schedule.  Stress. Stress can be caused by fear about a single event, such as giving a speech. Or you may have ongoing stress, such as worry about work or school.  Depression, anxiety, and other mental or emotional conditions.  Changes in your sleep habits or surroundings. This includes changes that happen where you sleep, such as noise, light, or sleeping in a different bed. It also includes changes in your sleep pattern, such as having jet lag or working a late shift.  Health problems, such as pain, breathing problems, and restless legs syndrome.  Lack of regular exercise.  Using alcohol, nicotine, or caffeine before bed.  How can you help yourself?  Here are some tips that may help you sleep more soundly and wake up feeling more refreshed.  Your sleeping area   Use your bedroom only for sleeping and sex. A bit of light reading may help you fall asleep. But if it doesn't, do your reading elsewhere in the house. Try not to use your TV, computer, smartphone, or tablet while you are in bed.  Be sure your bed is big enough to stretch out comfortably, especially if you have a sleep partner.  Keep your bedroom quiet, dark, and cool. Use curtains, blinds, or a sleep mask to block out light. To block out noise, use earplugs, soothing music, or a \"white noise\" machine.  Your " "evening and bedtime routine   Create a relaxing bedtime routine. You might want to take a warm shower or bath, or listen to soothing music.  Go to bed at the same time every night. And get up at the same time every morning, even if you feel tired.  What to avoid   Limit caffeine (coffee, tea, caffeinated sodas) during the day, and don't have any for at least 6 hours before bedtime.  Avoid drinking alcohol before bedtime. Alcohol can cause you to wake up more often during the night.  Try not to smoke or use tobacco, especially in the evening. Nicotine can keep you awake.  Limit naps during the day, especially close to bedtime.  Avoid lying in bed awake for too long. If you can't fall asleep or if you wake up in the middle of the night and can't get back to sleep within about 20 minutes, get out of bed and go to another room until you feel sleepy.  Avoid taking medicine right before bed that may keep you awake or make you feel hyper or energized. Your doctor can tell you if your medicine may do this and if you can take it earlier in the day.  If you can't sleep   Imagine yourself in a peaceful, pleasant scene. Focus on the details and feelings of being in a place that is relaxing.  Get up and do a quiet or boring activity until you feel sleepy.  Avoid drinking any liquids before going to bed to help prevent waking up often to use the bathroom.  Where can you learn more?  Go to https://www.Pivotshare.net/patiented  Enter J942 in the search box to learn more about \"Learning About Sleeping Well.\"  Current as of: July 10, 2023  Content Version: 14.2 2024 [facility] [employer].   Care instructions adapted under license by your healthcare professional. If you have questions about a medical condition or this instruction, always ask your healthcare professional. [employer] disclaims any warranty or liability for your use of this information.       "

## 2024-10-05 LAB
ALBUMIN SERPL BCG-MCNC: 4.4 G/DL (ref 3.5–5.2)
ALP SERPL-CCNC: 83 U/L (ref 40–150)
ALT SERPL W P-5'-P-CCNC: 25 U/L (ref 0–70)
ANION GAP SERPL CALCULATED.3IONS-SCNC: 9 MMOL/L (ref 7–15)
AST SERPL W P-5'-P-CCNC: 20 U/L (ref 0–45)
BILIRUB SERPL-MCNC: 1.3 MG/DL
BUN SERPL-MCNC: 13.5 MG/DL (ref 8–23)
CALCIUM SERPL-MCNC: 9.2 MG/DL (ref 8.8–10.4)
CHLORIDE SERPL-SCNC: 105 MMOL/L (ref 98–107)
CHOLEST SERPL-MCNC: 146 MG/DL
CREAT SERPL-MCNC: 0.98 MG/DL (ref 0.67–1.17)
EGFRCR SERPLBLD CKD-EPI 2021: 86 ML/MIN/1.73M2
FASTING STATUS PATIENT QL REPORTED: YES
FASTING STATUS PATIENT QL REPORTED: YES
GLUCOSE SERPL-MCNC: 100 MG/DL (ref 70–99)
HCO3 SERPL-SCNC: 26 MMOL/L (ref 22–29)
HDLC SERPL-MCNC: 50 MG/DL
LDLC SERPL CALC-MCNC: 81 MG/DL
NONHDLC SERPL-MCNC: 96 MG/DL
POTASSIUM SERPL-SCNC: 4.1 MMOL/L (ref 3.4–5.3)
PROT SERPL-MCNC: 7.4 G/DL (ref 6.4–8.3)
PSA SERPL DL<=0.01 NG/ML-MCNC: 1.53 NG/ML (ref 0–4.5)
SODIUM SERPL-SCNC: 140 MMOL/L (ref 135–145)
TRIGL SERPL-MCNC: 74 MG/DL

## 2024-10-07 NOTE — RESULT ENCOUNTER NOTE
Dear Beny,    Here is a summary of your recent test results:  -Normal red blood cell (hgb) levels, normal white blood cell count and normal platelet levels.  -PSA (prostate specific antigen) test is normal.  This indicates a low likelihood of prostate cancer.  ADVISE: rechecking this in 1 year.  -Cholesterol levels are at your goal levels.  ADVISE: continuing your medication, a regular exercise program with at least 150 minutes of aerobic exercise per week, and eating a low saturated fat/low carbohydrate diet.  Also, you should recheck this fasting cholesterol panel in 12 months.  -Liver and gallbladder tests (ALT,AST, Alk phos,bilirubin) are normal.  -Kidney function (GFR) is normal.  -Sodium is normal.  -Potassium is normal.  -Calcium is normal.  -Glucose is slight elevated and may be a sign of early diabetes (prediabetes). ADVISE:: eating a low carbohydrate diet, exercising, trying to lose weight (if necessary) and rechecking your glucose level in 12 months.  -Urine is normal.    For additional lab test information, www.[employer].com is a very good reference.    In addition, here is a list of due or overdue Health Maintenance reminders:  RSV VACCINE(1 - Risk 60-74 years 1-dose series) Never done  COVID-19 Vaccine(4 - 2024-25 season) due on 09/01/2024    Please call us at [phone] (or use [employer]) to address the above recommendations if needed.           Thank you very much for trusting me and [facility].     Have a peaceful day.    Healthy regards,  Anand Ramos MD

## 2025-09-04 ENCOUNTER — PATIENT OUTREACH (OUTPATIENT)
Dept: CARE COORDINATION | Facility: CLINIC | Age: 65
End: 2025-09-04
Payer: MEDICARE

## (undated) RX ORDER — FENTANYL CITRATE 50 UG/ML
INJECTION, SOLUTION INTRAMUSCULAR; INTRAVENOUS
Status: DISPENSED
Start: 2023-07-25